# Patient Record
Sex: FEMALE | Race: WHITE | NOT HISPANIC OR LATINO | Employment: OTHER | URBAN - METROPOLITAN AREA
[De-identification: names, ages, dates, MRNs, and addresses within clinical notes are randomized per-mention and may not be internally consistent; named-entity substitution may affect disease eponyms.]

---

## 2017-04-19 ENCOUNTER — AMBULATORY - HEALTHEAST (OUTPATIENT)
Dept: SURGERY | Facility: CLINIC | Age: 82
End: 2017-04-19

## 2017-07-12 ENCOUNTER — RECORDS - HEALTHEAST (OUTPATIENT)
Dept: ADMINISTRATIVE | Facility: OTHER | Age: 82
End: 2017-07-12

## 2017-07-25 ENCOUNTER — COMMUNICATION - HEALTHEAST (OUTPATIENT)
Dept: CARDIOLOGY | Facility: CLINIC | Age: 82
End: 2017-07-25

## 2017-07-27 ENCOUNTER — COMMUNICATION - HEALTHEAST (OUTPATIENT)
Dept: TELEHEALTH | Facility: CLINIC | Age: 82
End: 2017-07-27

## 2017-07-27 ENCOUNTER — HOSPITAL ENCOUNTER (OUTPATIENT)
Dept: CT IMAGING | Facility: CLINIC | Age: 82
Discharge: HOME | End: 2017-07-27
Attending: FAMILY MEDICINE

## 2017-07-27 DIAGNOSIS — R07.89 ATYPICAL CHEST PAIN: ICD-10-CM

## 2017-07-27 LAB
BSA FOR ECHO PROCEDURE: 1.43 M2
CV CALCIUM SCORE AGATSTON LM: 0
CV CALCIUM SCORING AGATSON LAD: 285
CV CALCIUM SCORING AGATSTON CX: 21
CV CALCIUM SCORING AGATSTON RCA: 154
CV CALCIUM SCORING AGATSTON TOTAL: 460
LEFT VENTRICLE HEART RATE: 69 BPM

## 2017-07-27 ASSESSMENT — MIFFLIN-ST. JEOR: SCORE: 850.05

## 2017-08-03 ENCOUNTER — RECORDS - HEALTHEAST (OUTPATIENT)
Dept: ADMINISTRATIVE | Facility: OTHER | Age: 82
End: 2017-08-03

## 2017-08-03 ENCOUNTER — AMBULATORY - HEALTHEAST (OUTPATIENT)
Dept: CARDIOLOGY | Facility: CLINIC | Age: 82
End: 2017-08-03

## 2017-08-09 ENCOUNTER — OFFICE VISIT - HEALTHEAST (OUTPATIENT)
Dept: CARDIOLOGY | Facility: CLINIC | Age: 82
End: 2017-08-09

## 2017-08-09 DIAGNOSIS — I25.84 CORONARY ARTERY DISEASE DUE TO CALCIFIED CORONARY LESION: ICD-10-CM

## 2017-08-09 DIAGNOSIS — I25.10 CORONARY ARTERY DISEASE DUE TO CALCIFIED CORONARY LESION: ICD-10-CM

## 2017-08-09 ASSESSMENT — MIFFLIN-ST. JEOR: SCORE: 872.73

## 2017-08-14 ENCOUNTER — COMMUNICATION - HEALTHEAST (OUTPATIENT)
Dept: CARDIOLOGY | Facility: CLINIC | Age: 82
End: 2017-08-14

## 2017-09-12 ENCOUNTER — OFFICE VISIT - HEALTHEAST (OUTPATIENT)
Dept: FAMILY MEDICINE | Facility: CLINIC | Age: 82
End: 2017-09-12

## 2017-09-12 DIAGNOSIS — R53.83 FATIGUE: ICD-10-CM

## 2017-09-12 DIAGNOSIS — R35.0 URINARY FREQUENCY: ICD-10-CM

## 2017-09-12 ASSESSMENT — MIFFLIN-ST. JEOR: SCORE: 872.73

## 2017-11-02 ENCOUNTER — OFFICE VISIT - HEALTHEAST (OUTPATIENT)
Dept: CARDIOLOGY | Facility: CLINIC | Age: 82
End: 2017-11-02

## 2017-11-02 DIAGNOSIS — E78.00 HYPERCHOLESTEROLEMIA: ICD-10-CM

## 2017-11-02 DIAGNOSIS — I25.10 CORONARY ARTERY DISEASE DUE TO CALCIFIED CORONARY LESION: ICD-10-CM

## 2017-11-02 DIAGNOSIS — I10 ESSENTIAL HYPERTENSION: ICD-10-CM

## 2017-11-02 DIAGNOSIS — I25.84 CORONARY ARTERY DISEASE DUE TO CALCIFIED CORONARY LESION: ICD-10-CM

## 2017-11-02 ASSESSMENT — MIFFLIN-ST. JEOR: SCORE: 873.64

## 2017-11-14 ENCOUNTER — RECORDS - HEALTHEAST (OUTPATIENT)
Dept: LAB | Facility: CLINIC | Age: 82
End: 2017-11-14

## 2017-11-14 LAB
ALT SERPL W P-5'-P-CCNC: 15 U/L (ref 0–45)
AST SERPL W P-5'-P-CCNC: 22 U/L (ref 0–40)
CHOLEST SERPL-MCNC: 138 MG/DL
FASTING STATUS PATIENT QL REPORTED: NO
HDLC SERPL-MCNC: 53 MG/DL
LDLC SERPL CALC-MCNC: 69 MG/DL
TRIGL SERPL-MCNC: 80 MG/DL

## 2017-11-15 ENCOUNTER — AMBULATORY - HEALTHEAST (OUTPATIENT)
Dept: CARDIOLOGY | Facility: CLINIC | Age: 82
End: 2017-11-15

## 2018-01-02 ENCOUNTER — AMBULATORY - HEALTHEAST (OUTPATIENT)
Dept: CARDIOLOGY | Facility: CLINIC | Age: 83
End: 2018-01-02

## 2018-01-02 DIAGNOSIS — E78.00 HYPERCHOLESTEROLEMIA: ICD-10-CM

## 2018-01-02 LAB
ALBUMIN SERPL-MCNC: 4.1 G/DL (ref 3.5–5)
ALP SERPL-CCNC: 71 U/L (ref 45–120)
ALT SERPL W P-5'-P-CCNC: 17 U/L (ref 0–45)
AST SERPL W P-5'-P-CCNC: 21 U/L (ref 0–40)
BILIRUB DIRECT SERPL-MCNC: 0.3 MG/DL
BILIRUB SERPL-MCNC: 0.8 MG/DL (ref 0–1)
CHOLEST SERPL-MCNC: 161 MG/DL
CK SERPL-CCNC: 176 U/L (ref 30–190)
FASTING STATUS PATIENT QL REPORTED: YES
HDLC SERPL-MCNC: 67 MG/DL
LDLC SERPL CALC-MCNC: 78 MG/DL
PROT SERPL-MCNC: 7 G/DL (ref 6–8)
TRIGL SERPL-MCNC: 80 MG/DL

## 2018-01-05 ENCOUNTER — COMMUNICATION - HEALTHEAST (OUTPATIENT)
Dept: CARDIOLOGY | Facility: CLINIC | Age: 83
End: 2018-01-05

## 2018-01-11 ENCOUNTER — COMMUNICATION - HEALTHEAST (OUTPATIENT)
Dept: CARDIOLOGY | Facility: CLINIC | Age: 83
End: 2018-01-11

## 2018-01-17 ENCOUNTER — RECORDS - HEALTHEAST (OUTPATIENT)
Dept: ADMINISTRATIVE | Facility: OTHER | Age: 83
End: 2018-01-17

## 2018-01-25 ENCOUNTER — COMMUNICATION - HEALTHEAST (OUTPATIENT)
Dept: ADMINISTRATIVE | Facility: CLINIC | Age: 83
End: 2018-01-25

## 2018-02-12 ENCOUNTER — RECORDS - HEALTHEAST (OUTPATIENT)
Dept: LAB | Facility: CLINIC | Age: 83
End: 2018-02-12

## 2018-02-12 LAB
ALBUMIN SERPL-MCNC: 4.2 G/DL (ref 3.5–5)
ALP SERPL-CCNC: 63 U/L (ref 45–120)
ALT SERPL W P-5'-P-CCNC: 21 U/L (ref 0–45)
ANION GAP SERPL CALCULATED.3IONS-SCNC: 8 MMOL/L (ref 5–18)
AST SERPL W P-5'-P-CCNC: 23 U/L (ref 0–40)
BILIRUB SERPL-MCNC: 0.7 MG/DL (ref 0–1)
BUN SERPL-MCNC: 9 MG/DL (ref 8–28)
CALCIUM SERPL-MCNC: 9.8 MG/DL (ref 8.5–10.5)
CHLORIDE BLD-SCNC: 96 MMOL/L (ref 98–107)
CO2 SERPL-SCNC: 31 MMOL/L (ref 22–31)
CREAT SERPL-MCNC: 0.67 MG/DL (ref 0.6–1.1)
GFR SERPL CREATININE-BSD FRML MDRD: >60 ML/MIN/1.73M2
GLUCOSE BLD-MCNC: 90 MG/DL (ref 70–125)
LIPASE SERPL-CCNC: 35 U/L (ref 0–52)
POTASSIUM BLD-SCNC: 4 MMOL/L (ref 3.5–5)
PROT SERPL-MCNC: 7.3 G/DL (ref 6–8)
SODIUM SERPL-SCNC: 135 MMOL/L (ref 136–145)

## 2018-02-27 ENCOUNTER — RECORDS - HEALTHEAST (OUTPATIENT)
Dept: ADMINISTRATIVE | Facility: OTHER | Age: 83
End: 2018-02-27

## 2018-02-27 ENCOUNTER — AMBULATORY - HEALTHEAST (OUTPATIENT)
Dept: CARDIOLOGY | Facility: CLINIC | Age: 83
End: 2018-02-27

## 2018-03-01 ENCOUNTER — OFFICE VISIT - HEALTHEAST (OUTPATIENT)
Dept: CARDIOLOGY | Facility: CLINIC | Age: 83
End: 2018-03-01

## 2018-03-01 DIAGNOSIS — I10 ESSENTIAL HYPERTENSION: ICD-10-CM

## 2018-03-01 DIAGNOSIS — I25.84 CORONARY ARTERY DISEASE DUE TO CALCIFIED CORONARY LESION: ICD-10-CM

## 2018-03-01 DIAGNOSIS — I25.10 CORONARY ARTERY DISEASE DUE TO CALCIFIED CORONARY LESION: ICD-10-CM

## 2018-03-01 DIAGNOSIS — E78.00 HYPERCHOLESTEROLEMIA: ICD-10-CM

## 2018-03-01 ASSESSMENT — MIFFLIN-ST. JEOR: SCORE: 886.34

## 2018-03-14 ENCOUNTER — RECORDS - HEALTHEAST (OUTPATIENT)
Dept: ADMINISTRATIVE | Facility: OTHER | Age: 83
End: 2018-03-14

## 2018-03-19 ENCOUNTER — HOSPITAL ENCOUNTER (OUTPATIENT)
Dept: NUCLEAR MEDICINE | Facility: HOSPITAL | Age: 83
Discharge: HOME | End: 2018-03-19
Attending: FAMILY MEDICINE

## 2018-03-19 DIAGNOSIS — R11.0 NAUSEA: ICD-10-CM

## 2018-03-19 ASSESSMENT — MIFFLIN-ST. JEOR: SCORE: 872.73

## 2018-03-21 ENCOUNTER — RECORDS - HEALTHEAST (OUTPATIENT)
Dept: ADMINISTRATIVE | Facility: OTHER | Age: 83
End: 2018-03-21

## 2018-05-21 ENCOUNTER — RECORDS - HEALTHEAST (OUTPATIENT)
Dept: LAB | Facility: CLINIC | Age: 83
End: 2018-05-21

## 2018-05-21 LAB
ALBUMIN SERPL-MCNC: 4.2 G/DL (ref 3.5–5)
ALP SERPL-CCNC: 82 U/L (ref 45–120)
ALT SERPL W P-5'-P-CCNC: 15 U/L (ref 0–45)
ANION GAP SERPL CALCULATED.3IONS-SCNC: 14 MMOL/L (ref 5–18)
AST SERPL W P-5'-P-CCNC: 21 U/L (ref 0–40)
BILIRUB SERPL-MCNC: 0.5 MG/DL (ref 0–1)
BUN SERPL-MCNC: 10 MG/DL (ref 8–28)
C REACTIVE PROTEIN LHE: 1.7 MG/DL (ref 0–0.8)
CALCIUM SERPL-MCNC: 9.6 MG/DL (ref 8.5–10.5)
CHLORIDE BLD-SCNC: 96 MMOL/L (ref 98–107)
CO2 SERPL-SCNC: 23 MMOL/L (ref 22–31)
CREAT SERPL-MCNC: 0.77 MG/DL (ref 0.6–1.1)
ERYTHROCYTE [SEDIMENTATION RATE] IN BLOOD BY WESTERGREN METHOD: 11 MM/HR (ref 0–20)
GFR SERPL CREATININE-BSD FRML MDRD: >60 ML/MIN/1.73M2
GLUCOSE BLD-MCNC: 95 MG/DL (ref 70–125)
POTASSIUM BLD-SCNC: 3.8 MMOL/L (ref 3.5–5)
PROT SERPL-MCNC: 7 G/DL (ref 6–8)
SODIUM SERPL-SCNC: 133 MMOL/L (ref 136–145)
T4 FREE SERPL-MCNC: 1.1 NG/DL (ref 0.7–1.8)
TSH SERPL DL<=0.005 MIU/L-ACNC: 1.12 UIU/ML (ref 0.3–5)

## 2018-05-22 ENCOUNTER — RECORDS - HEALTHEAST (OUTPATIENT)
Dept: LAB | Facility: CLINIC | Age: 83
End: 2018-05-22

## 2018-05-22 LAB
CCP AB SER IA-ACNC: 1.3 U/ML
RHEUMATOID FACT SERPL-ACNC: <15 IU/ML (ref 0–30)

## 2018-05-24 LAB — ANA SER QL: 0.1 U

## 2018-05-31 ENCOUNTER — RECORDS - HEALTHEAST (OUTPATIENT)
Dept: ADMINISTRATIVE | Facility: OTHER | Age: 83
End: 2018-05-31

## 2018-06-06 ENCOUNTER — COMMUNICATION - HEALTHEAST (OUTPATIENT)
Dept: CARDIOLOGY | Facility: CLINIC | Age: 83
End: 2018-06-06

## 2018-06-07 ENCOUNTER — COMMUNICATION - HEALTHEAST (OUTPATIENT)
Dept: SCHEDULING | Facility: CLINIC | Age: 83
End: 2018-06-07

## 2018-06-18 ENCOUNTER — AMBULATORY - HEALTHEAST (OUTPATIENT)
Dept: CARDIOLOGY | Facility: CLINIC | Age: 83
End: 2018-06-18

## 2018-06-18 ENCOUNTER — RECORDS - HEALTHEAST (OUTPATIENT)
Dept: ADMINISTRATIVE | Facility: OTHER | Age: 83
End: 2018-06-18

## 2018-06-20 ENCOUNTER — OFFICE VISIT - HEALTHEAST (OUTPATIENT)
Dept: CARDIOLOGY | Facility: CLINIC | Age: 83
End: 2018-06-20

## 2018-06-20 DIAGNOSIS — I25.10 CORONARY ARTERY DISEASE DUE TO CALCIFIED CORONARY LESION: ICD-10-CM

## 2018-06-20 DIAGNOSIS — E78.00 HYPERCHOLESTEROLEMIA: ICD-10-CM

## 2018-06-20 DIAGNOSIS — R07.89 ATYPICAL CHEST PAIN: ICD-10-CM

## 2018-06-20 DIAGNOSIS — I25.84 CORONARY ARTERY DISEASE DUE TO CALCIFIED CORONARY LESION: ICD-10-CM

## 2018-06-20 DIAGNOSIS — I10 ESSENTIAL HYPERTENSION: ICD-10-CM

## 2018-06-20 ASSESSMENT — MIFFLIN-ST. JEOR: SCORE: 886.34

## 2018-08-06 ENCOUNTER — RECORDS - HEALTHEAST (OUTPATIENT)
Dept: LAB | Facility: CLINIC | Age: 83
End: 2018-08-06

## 2018-08-06 LAB
C REACTIVE PROTEIN LHE: 0.1 MG/DL (ref 0–0.8)
ERYTHROCYTE [SEDIMENTATION RATE] IN BLOOD BY WESTERGREN METHOD: 7 MM/HR (ref 0–20)

## 2018-10-10 ENCOUNTER — RECORDS - HEALTHEAST (OUTPATIENT)
Dept: ADMINISTRATIVE | Facility: OTHER | Age: 83
End: 2018-10-10

## 2018-10-23 ENCOUNTER — COMMUNICATION - HEALTHEAST (OUTPATIENT)
Dept: ADMINISTRATIVE | Facility: CLINIC | Age: 83
End: 2018-10-23

## 2018-11-08 ENCOUNTER — COMMUNICATION - HEALTHEAST (OUTPATIENT)
Dept: CARDIOLOGY | Facility: CLINIC | Age: 83
End: 2018-11-08

## 2018-11-08 DIAGNOSIS — E78.00 HYPERCHOLESTEROLEMIA: ICD-10-CM

## 2018-12-17 ENCOUNTER — RECORDS - HEALTHEAST (OUTPATIENT)
Dept: ADMINISTRATIVE | Facility: OTHER | Age: 83
End: 2018-12-17

## 2018-12-20 ENCOUNTER — OFFICE VISIT - HEALTHEAST (OUTPATIENT)
Dept: CARDIOLOGY | Facility: CLINIC | Age: 83
End: 2018-12-20

## 2018-12-20 DIAGNOSIS — I25.10 CORONARY ARTERY DISEASE DUE TO CALCIFIED CORONARY LESION: ICD-10-CM

## 2018-12-20 DIAGNOSIS — I10 ESSENTIAL HYPERTENSION: ICD-10-CM

## 2018-12-20 DIAGNOSIS — I25.84 CORONARY ARTERY DISEASE DUE TO CALCIFIED CORONARY LESION: ICD-10-CM

## 2018-12-20 DIAGNOSIS — E78.00 HYPERCHOLESTEROLEMIA: ICD-10-CM

## 2018-12-20 ASSESSMENT — MIFFLIN-ST. JEOR: SCORE: 881.8

## 2019-03-26 ENCOUNTER — RECORDS - HEALTHEAST (OUTPATIENT)
Dept: LAB | Facility: CLINIC | Age: 84
End: 2019-03-26

## 2019-03-27 LAB
HSV SPECIMEN: NORMAL
HSV1 DNA SPEC QL NAA+PROBE: NEGATIVE
HSV2 DNA SPEC QL NAA+PROBE: NEGATIVE

## 2019-03-28 LAB — BACTERIA SPEC CULT: ABNORMAL

## 2019-05-14 ENCOUNTER — RECORDS - HEALTHEAST (OUTPATIENT)
Dept: LAB | Facility: CLINIC | Age: 84
End: 2019-05-14

## 2019-05-14 LAB
ALBUMIN SERPL-MCNC: 4.3 G/DL (ref 3.5–5)
ALP SERPL-CCNC: 59 U/L (ref 45–120)
ALT SERPL W P-5'-P-CCNC: 15 U/L (ref 0–45)
ANION GAP SERPL CALCULATED.3IONS-SCNC: 10 MMOL/L (ref 5–18)
AST SERPL W P-5'-P-CCNC: 19 U/L (ref 0–40)
BILIRUB SERPL-MCNC: 0.7 MG/DL (ref 0–1)
BUN SERPL-MCNC: 13 MG/DL (ref 8–28)
CALCIUM SERPL-MCNC: 10 MG/DL (ref 8.5–10.5)
CHLORIDE BLD-SCNC: 97 MMOL/L (ref 98–107)
CHOLEST SERPL-MCNC: 154 MG/DL
CO2 SERPL-SCNC: 27 MMOL/L (ref 22–31)
CREAT SERPL-MCNC: 0.76 MG/DL (ref 0.6–1.1)
FASTING STATUS PATIENT QL REPORTED: NORMAL
GFR SERPL CREATININE-BSD FRML MDRD: >60 ML/MIN/1.73M2
GLUCOSE BLD-MCNC: 88 MG/DL (ref 70–125)
HDLC SERPL-MCNC: 62 MG/DL
LDLC SERPL CALC-MCNC: 81 MG/DL
POTASSIUM BLD-SCNC: 4.1 MMOL/L (ref 3.5–5)
PROT SERPL-MCNC: 6.9 G/DL (ref 6–8)
SODIUM SERPL-SCNC: 134 MMOL/L (ref 136–145)
TRIGL SERPL-MCNC: 53 MG/DL

## 2019-05-17 ENCOUNTER — RECORDS - HEALTHEAST (OUTPATIENT)
Dept: LAB | Facility: CLINIC | Age: 84
End: 2019-05-17

## 2019-05-17 LAB — TSH SERPL DL<=0.005 MIU/L-ACNC: 1.81 UIU/ML (ref 0.3–5)

## 2019-10-07 ENCOUNTER — OFFICE VISIT - HEALTHEAST (OUTPATIENT)
Dept: FAMILY MEDICINE | Facility: CLINIC | Age: 84
End: 2019-10-07

## 2019-10-07 DIAGNOSIS — J32.1 CHRONIC FRONTAL SINUSITIS: ICD-10-CM

## 2019-10-07 DIAGNOSIS — J43.9 PULMONARY EMPHYSEMA, UNSPECIFIED EMPHYSEMA TYPE (H): ICD-10-CM

## 2019-10-07 ASSESSMENT — MIFFLIN-ST. JEOR: SCORE: 852.04

## 2019-10-11 ENCOUNTER — COMMUNICATION - HEALTHEAST (OUTPATIENT)
Dept: FAMILY MEDICINE | Facility: CLINIC | Age: 84
End: 2019-10-11

## 2019-10-18 ENCOUNTER — COMMUNICATION - HEALTHEAST (OUTPATIENT)
Dept: FAMILY MEDICINE | Facility: CLINIC | Age: 84
End: 2019-10-18

## 2019-10-18 DIAGNOSIS — I10 ESSENTIAL HYPERTENSION: ICD-10-CM

## 2019-11-06 ENCOUNTER — COMMUNICATION - HEALTHEAST (OUTPATIENT)
Dept: FAMILY MEDICINE | Facility: CLINIC | Age: 84
End: 2019-11-06

## 2019-11-09 ENCOUNTER — COMMUNICATION - HEALTHEAST (OUTPATIENT)
Dept: FAMILY MEDICINE | Facility: CLINIC | Age: 84
End: 2019-11-09

## 2019-11-09 DIAGNOSIS — I10 ESSENTIAL HYPERTENSION: ICD-10-CM

## 2019-11-22 ENCOUNTER — OFFICE VISIT - HEALTHEAST (OUTPATIENT)
Dept: FAMILY MEDICINE | Facility: CLINIC | Age: 84
End: 2019-11-22

## 2019-11-22 DIAGNOSIS — J98.4 LUNG DISEASE: ICD-10-CM

## 2019-11-22 DIAGNOSIS — R10.12 LUQ ABDOMINAL PAIN: ICD-10-CM

## 2019-11-22 ASSESSMENT — MIFFLIN-ST. JEOR: SCORE: 838.99

## 2019-12-02 ENCOUNTER — COMMUNICATION - HEALTHEAST (OUTPATIENT)
Dept: SCHEDULING | Facility: CLINIC | Age: 84
End: 2019-12-02

## 2019-12-06 ENCOUNTER — COMMUNICATION - HEALTHEAST (OUTPATIENT)
Dept: FAMILY MEDICINE | Facility: CLINIC | Age: 84
End: 2019-12-06

## 2019-12-06 DIAGNOSIS — I10 ESSENTIAL HYPERTENSION: ICD-10-CM

## 2019-12-08 ENCOUNTER — COMMUNICATION - HEALTHEAST (OUTPATIENT)
Dept: SCHEDULING | Facility: CLINIC | Age: 84
End: 2019-12-08

## 2019-12-08 DIAGNOSIS — J44.9 CHRONIC OBSTRUCTIVE PULMONARY DISEASE, UNSPECIFIED COPD TYPE (H): ICD-10-CM

## 2019-12-11 ENCOUNTER — COMMUNICATION - HEALTHEAST (OUTPATIENT)
Dept: SCHEDULING | Facility: CLINIC | Age: 84
End: 2019-12-11

## 2019-12-11 DIAGNOSIS — J44.9 CHRONIC OBSTRUCTIVE PULMONARY DISEASE, UNSPECIFIED COPD TYPE (H): ICD-10-CM

## 2019-12-15 ENCOUNTER — COMMUNICATION - HEALTHEAST (OUTPATIENT)
Dept: FAMILY MEDICINE | Facility: CLINIC | Age: 84
End: 2019-12-15

## 2019-12-15 DIAGNOSIS — F41.9 ANXIETY: ICD-10-CM

## 2019-12-16 ENCOUNTER — RECORDS - HEALTHEAST (OUTPATIENT)
Dept: FAMILY MEDICINE | Facility: CLINIC | Age: 84
End: 2019-12-16

## 2019-12-16 ENCOUNTER — COMMUNICATION - HEALTHEAST (OUTPATIENT)
Dept: FAMILY MEDICINE | Facility: CLINIC | Age: 84
End: 2019-12-16

## 2019-12-16 DIAGNOSIS — F41.1 ANXIETY, GENERALIZED: ICD-10-CM

## 2019-12-16 DIAGNOSIS — I10 ESSENTIAL HYPERTENSION: ICD-10-CM

## 2019-12-17 ENCOUNTER — OFFICE VISIT - HEALTHEAST (OUTPATIENT)
Dept: FAMILY MEDICINE | Facility: CLINIC | Age: 84
End: 2019-12-17

## 2019-12-17 ENCOUNTER — COMMUNICATION - HEALTHEAST (OUTPATIENT)
Dept: SCHEDULING | Facility: CLINIC | Age: 84
End: 2019-12-17

## 2019-12-17 DIAGNOSIS — J01.01 ACUTE RECURRENT MAXILLARY SINUSITIS: ICD-10-CM

## 2019-12-17 DIAGNOSIS — R06.02 SHORTNESS OF BREATH: ICD-10-CM

## 2019-12-17 ASSESSMENT — MIFFLIN-ST. JEOR: SCORE: 832.19

## 2019-12-20 ENCOUNTER — COMMUNICATION - HEALTHEAST (OUTPATIENT)
Dept: FAMILY MEDICINE | Facility: CLINIC | Age: 84
End: 2019-12-20

## 2019-12-20 DIAGNOSIS — F41.9 ANXIETY: ICD-10-CM

## 2019-12-20 DIAGNOSIS — F41.1 ANXIETY, GENERALIZED: ICD-10-CM

## 2020-01-14 ENCOUNTER — OFFICE VISIT - HEALTHEAST (OUTPATIENT)
Dept: FAMILY MEDICINE | Facility: CLINIC | Age: 85
End: 2020-01-14

## 2020-01-14 DIAGNOSIS — F41.1 ANXIETY, GENERALIZED: ICD-10-CM

## 2020-01-14 DIAGNOSIS — R05.9 COUGH: ICD-10-CM

## 2020-01-14 DIAGNOSIS — I10 ESSENTIAL HYPERTENSION: ICD-10-CM

## 2020-01-14 DIAGNOSIS — E78.00 HYPERCHOLESTEROLEMIA: ICD-10-CM

## 2020-01-14 DIAGNOSIS — D64.9 ANEMIA, UNSPECIFIED TYPE: ICD-10-CM

## 2020-01-14 DIAGNOSIS — Z00.01 ENCOUNTER FOR GENERAL ADULT MEDICAL EXAMINATION WITH ABNORMAL FINDINGS: ICD-10-CM

## 2020-01-14 LAB
ALBUMIN SERPL-MCNC: 4.4 G/DL (ref 3.5–5)
ALP SERPL-CCNC: 67 U/L (ref 45–120)
ALT SERPL W P-5'-P-CCNC: 14 U/L (ref 0–45)
ANION GAP SERPL CALCULATED.3IONS-SCNC: 10 MMOL/L (ref 5–18)
AST SERPL W P-5'-P-CCNC: 23 U/L (ref 0–40)
BILIRUB SERPL-MCNC: 0.8 MG/DL (ref 0–1)
BUN SERPL-MCNC: 14 MG/DL (ref 8–28)
CALCIUM SERPL-MCNC: 9.6 MG/DL (ref 8.5–10.5)
CHLORIDE BLD-SCNC: 100 MMOL/L (ref 98–107)
CHOLEST SERPL-MCNC: 151 MG/DL
CO2 SERPL-SCNC: 28 MMOL/L (ref 22–31)
CREAT SERPL-MCNC: 0.72 MG/DL (ref 0.6–1.1)
FASTING STATUS PATIENT QL REPORTED: YES
GFR SERPL CREATININE-BSD FRML MDRD: >60 ML/MIN/1.73M2
GLUCOSE BLD-MCNC: 91 MG/DL (ref 70–125)
HDLC SERPL-MCNC: 49 MG/DL
HGB BLD-MCNC: 15.1 G/DL (ref 12–16)
LDLC SERPL CALC-MCNC: 64 MG/DL
POTASSIUM BLD-SCNC: 4 MMOL/L (ref 3.5–5)
PROT SERPL-MCNC: 7.1 G/DL (ref 6–8)
SODIUM SERPL-SCNC: 138 MMOL/L (ref 136–145)
TRIGL SERPL-MCNC: 190 MG/DL

## 2020-01-14 ASSESSMENT — MIFFLIN-ST. JEOR: SCORE: 812.06

## 2020-01-15 ENCOUNTER — COMMUNICATION - HEALTHEAST (OUTPATIENT)
Dept: FAMILY MEDICINE | Facility: CLINIC | Age: 85
End: 2020-01-15

## 2020-01-16 ENCOUNTER — COMMUNICATION - HEALTHEAST (OUTPATIENT)
Dept: FAMILY MEDICINE | Facility: CLINIC | Age: 85
End: 2020-01-16

## 2020-01-16 DIAGNOSIS — F41.9 ANXIETY: ICD-10-CM

## 2020-01-16 DIAGNOSIS — F41.1 ANXIETY, GENERALIZED: ICD-10-CM

## 2020-01-20 ENCOUNTER — COMMUNICATION - HEALTHEAST (OUTPATIENT)
Dept: FAMILY MEDICINE | Facility: CLINIC | Age: 85
End: 2020-01-20

## 2020-01-20 DIAGNOSIS — E78.00 HYPERCHOLESTEROLEMIA: ICD-10-CM

## 2020-01-20 DIAGNOSIS — I10 ESSENTIAL HYPERTENSION: ICD-10-CM

## 2020-01-31 ENCOUNTER — COMMUNICATION - HEALTHEAST (OUTPATIENT)
Dept: FAMILY MEDICINE | Facility: CLINIC | Age: 85
End: 2020-01-31

## 2020-01-31 DIAGNOSIS — F41.9 ANXIETY: ICD-10-CM

## 2020-02-17 ENCOUNTER — COMMUNICATION - HEALTHEAST (OUTPATIENT)
Dept: FAMILY MEDICINE | Facility: CLINIC | Age: 85
End: 2020-02-17

## 2020-02-17 DIAGNOSIS — F41.1 ANXIETY, GENERALIZED: ICD-10-CM

## 2020-03-12 ENCOUNTER — COMMUNICATION - HEALTHEAST (OUTPATIENT)
Dept: FAMILY MEDICINE | Facility: CLINIC | Age: 85
End: 2020-03-12

## 2020-03-12 DIAGNOSIS — I10 ESSENTIAL HYPERTENSION: ICD-10-CM

## 2020-03-12 DIAGNOSIS — R05.9 COUGH: ICD-10-CM

## 2020-03-13 ENCOUNTER — COMMUNICATION - HEALTHEAST (OUTPATIENT)
Dept: FAMILY MEDICINE | Facility: CLINIC | Age: 85
End: 2020-03-13

## 2020-03-13 DIAGNOSIS — F41.9 ANXIETY: ICD-10-CM

## 2020-03-23 ENCOUNTER — COMMUNICATION - HEALTHEAST (OUTPATIENT)
Dept: FAMILY MEDICINE | Facility: CLINIC | Age: 85
End: 2020-03-23

## 2020-03-23 DIAGNOSIS — F41.1 ANXIETY, GENERALIZED: ICD-10-CM

## 2020-03-28 ENCOUNTER — COMMUNICATION - HEALTHEAST (OUTPATIENT)
Dept: SCHEDULING | Facility: CLINIC | Age: 85
End: 2020-03-28

## 2020-03-28 DIAGNOSIS — R05.9 COUGH: ICD-10-CM

## 2020-04-15 ENCOUNTER — COMMUNICATION - HEALTHEAST (OUTPATIENT)
Dept: FAMILY MEDICINE | Facility: CLINIC | Age: 85
End: 2020-04-15

## 2020-04-15 DIAGNOSIS — F41.9 ANXIETY: ICD-10-CM

## 2020-04-25 ENCOUNTER — COMMUNICATION - HEALTHEAST (OUTPATIENT)
Dept: FAMILY MEDICINE | Facility: CLINIC | Age: 85
End: 2020-04-25

## 2020-04-25 DIAGNOSIS — F41.1 ANXIETY, GENERALIZED: ICD-10-CM

## 2020-04-30 ENCOUNTER — OFFICE VISIT - HEALTHEAST (OUTPATIENT)
Dept: CARDIOLOGY | Facility: CLINIC | Age: 85
End: 2020-04-30

## 2020-04-30 DIAGNOSIS — I25.84 CORONARY ARTERY DISEASE DUE TO CALCIFIED CORONARY LESION: ICD-10-CM

## 2020-04-30 DIAGNOSIS — I25.10 CORONARY ARTERY DISEASE DUE TO CALCIFIED CORONARY LESION: ICD-10-CM

## 2020-05-19 ENCOUNTER — COMMUNICATION - HEALTHEAST (OUTPATIENT)
Dept: FAMILY MEDICINE | Facility: CLINIC | Age: 85
End: 2020-05-19

## 2020-05-19 DIAGNOSIS — J98.4 LUNG DISEASE: ICD-10-CM

## 2020-05-21 ENCOUNTER — OFFICE VISIT - HEALTHEAST (OUTPATIENT)
Dept: FAMILY MEDICINE | Facility: CLINIC | Age: 85
End: 2020-05-21

## 2020-05-21 DIAGNOSIS — M19.041 PRIMARY OSTEOARTHRITIS OF BOTH HANDS: ICD-10-CM

## 2020-05-21 DIAGNOSIS — R10.9 LEFT SIDED ABDOMINAL PAIN: ICD-10-CM

## 2020-05-21 DIAGNOSIS — M62.81 GENERALIZED MUSCLE WEAKNESS: ICD-10-CM

## 2020-05-21 DIAGNOSIS — F41.1 ANXIETY, GENERALIZED: ICD-10-CM

## 2020-05-21 DIAGNOSIS — I10 ESSENTIAL HYPERTENSION: ICD-10-CM

## 2020-05-21 DIAGNOSIS — M19.042 PRIMARY OSTEOARTHRITIS OF BOTH HANDS: ICD-10-CM

## 2020-05-21 LAB
ALBUMIN SERPL-MCNC: 4.2 G/DL (ref 3.5–5)
ALP SERPL-CCNC: 61 U/L (ref 45–120)
ALT SERPL W P-5'-P-CCNC: 21 U/L (ref 0–45)
ANION GAP SERPL CALCULATED.3IONS-SCNC: 11 MMOL/L (ref 5–18)
AST SERPL W P-5'-P-CCNC: 30 U/L (ref 0–40)
BILIRUB SERPL-MCNC: 0.6 MG/DL (ref 0–1)
BUN SERPL-MCNC: 10 MG/DL (ref 8–28)
CALCIUM SERPL-MCNC: 8.6 MG/DL (ref 8.5–10.5)
CHLORIDE BLD-SCNC: 99 MMOL/L (ref 98–107)
CO2 SERPL-SCNC: 23 MMOL/L (ref 22–31)
CREAT SERPL-MCNC: 0.71 MG/DL (ref 0.6–1.1)
ERYTHROCYTE [DISTWIDTH] IN BLOOD BY AUTOMATED COUNT: 12.6 % (ref 11–14.5)
GFR SERPL CREATININE-BSD FRML MDRD: >60 ML/MIN/1.73M2
GLUCOSE BLD-MCNC: 86 MG/DL (ref 70–125)
HCT VFR BLD AUTO: 41.2 % (ref 35–47)
HGB BLD-MCNC: 14.4 G/DL (ref 12–16)
MCH RBC QN AUTO: 30.8 PG (ref 27–34)
MCHC RBC AUTO-ENTMCNC: 35 G/DL (ref 32–36)
MCV RBC AUTO: 88 FL (ref 80–100)
PLATELET # BLD AUTO: 196 THOU/UL (ref 140–440)
PMV BLD AUTO: 6.8 FL (ref 7–10)
POTASSIUM BLD-SCNC: 3.7 MMOL/L (ref 3.5–5)
PROT SERPL-MCNC: 6.7 G/DL (ref 6–8)
RBC # BLD AUTO: 4.69 MILL/UL (ref 3.8–5.4)
SODIUM SERPL-SCNC: 133 MMOL/L (ref 136–145)
TSH SERPL DL<=0.005 MIU/L-ACNC: 1.47 UIU/ML (ref 0.3–5)
WBC: 5.5 THOU/UL (ref 4–11)

## 2020-05-21 ASSESSMENT — MIFFLIN-ST. JEOR: SCORE: 852.07

## 2020-05-22 ENCOUNTER — COMMUNICATION - HEALTHEAST (OUTPATIENT)
Dept: FAMILY MEDICINE | Facility: CLINIC | Age: 85
End: 2020-05-22

## 2020-05-27 ENCOUNTER — COMMUNICATION - HEALTHEAST (OUTPATIENT)
Dept: FAMILY MEDICINE | Facility: CLINIC | Age: 85
End: 2020-05-27

## 2020-05-27 DIAGNOSIS — F41.1 ANXIETY, GENERALIZED: ICD-10-CM

## 2020-05-27 DIAGNOSIS — F41.9 ANXIETY: ICD-10-CM

## 2020-06-28 ENCOUNTER — COMMUNICATION - HEALTHEAST (OUTPATIENT)
Dept: FAMILY MEDICINE | Facility: CLINIC | Age: 85
End: 2020-06-28

## 2020-06-28 DIAGNOSIS — F41.9 ANXIETY: ICD-10-CM

## 2020-06-28 DIAGNOSIS — F41.1 ANXIETY, GENERALIZED: ICD-10-CM

## 2020-07-01 ENCOUNTER — COMMUNICATION - HEALTHEAST (OUTPATIENT)
Dept: FAMILY MEDICINE | Facility: CLINIC | Age: 85
End: 2020-07-01

## 2020-07-01 DIAGNOSIS — J98.4 LUNG DISEASE: ICD-10-CM

## 2020-07-02 ENCOUNTER — COMMUNICATION - HEALTHEAST (OUTPATIENT)
Dept: SCHEDULING | Facility: CLINIC | Age: 85
End: 2020-07-02

## 2020-07-10 ENCOUNTER — OFFICE VISIT - HEALTHEAST (OUTPATIENT)
Dept: PULMONOLOGY | Facility: OTHER | Age: 85
End: 2020-07-10

## 2020-07-10 ENCOUNTER — COMMUNICATION - HEALTHEAST (OUTPATIENT)
Dept: CARDIOLOGY | Facility: CLINIC | Age: 85
End: 2020-07-10

## 2020-07-10 DIAGNOSIS — R06.00 DYSPNEA, UNSPECIFIED TYPE: ICD-10-CM

## 2020-07-10 DIAGNOSIS — J32.9 CHRONIC SINUSITIS, UNSPECIFIED LOCATION: ICD-10-CM

## 2020-07-23 ENCOUNTER — HOSPITAL ENCOUNTER (OUTPATIENT)
Dept: CARDIOLOGY | Facility: HOSPITAL | Age: 85
Discharge: HOME | End: 2020-07-23
Attending: INTERNAL MEDICINE

## 2020-07-23 ENCOUNTER — COMMUNICATION - HEALTHEAST (OUTPATIENT)
Dept: PULMONOLOGY | Facility: OTHER | Age: 85
End: 2020-07-23

## 2020-07-23 DIAGNOSIS — R06.00 DYSPNEA, UNSPECIFIED TYPE: ICD-10-CM

## 2020-07-23 LAB
AORTIC ROOT: 3 CM
AORTIC VALVE MEAN VELOCITY: 98.2 CM/S
ASCENDING AORTA: 3.8 CM
AV DIMENSIONLESS INDEX VTI: 0.6
AV MEAN GRADIENT: 5 MMHG
AV PEAK GRADIENT: 8.1 MMHG
AV VALVE AREA: 2 CM2
BSA FOR ECHO PROCEDURE: 1.44 M2
CV BLOOD PRESSURE: ABNORMAL MMHG
CV ECHO HEIGHT: 58.3 IN
CV ECHO WEIGHT: 112 LBS
DOP CALC AO PEAK VEL: 142 CM/S
DOP CALC AO VTI: 36.1 CM
DOP CALC LVOT AREA: 3.46 CM2
DOP CALC LVOT DIAMETER: 2.1 CM
DOP CALC LVOT STROKE VOLUME: 73.7 CM3
DOP CALCLVOT PEAK VEL VTI: 21.3 CM
EJECTION FRACTION: 70 % (ref 55–75)
FRACTIONAL SHORTENING: 40.6 % (ref 28–44)
INTERVENTRICULAR SEPTUM IN END DIASTOLE: 1.1 CM (ref 0.6–0.9)
IVS/PW RATIO: 1.1
LA AREA 1: 13.6 CM2
LA AREA 2: 11.6 CM2
LEFT ATRIUM LENGTH: 4.23 CM
LEFT ATRIUM SIZE: 3 CM
LEFT ATRIUM TO AORTIC ROOT RATIO: 1 NO UNITS
LEFT ATRIUM VOLUME INDEX: 22 ML/M2
LEFT ATRIUM VOLUME: 31.7 ML
LEFT VENTRICLE DIASTOLIC VOLUME INDEX: 61.1 CM3/M2 (ref 29–61)
LEFT VENTRICLE DIASTOLIC VOLUME: 88 CM3 (ref 46–106)
LEFT VENTRICLE MASS INDEX: 67.5 G/M2
LEFT VENTRICLE SYSTOLIC VOLUME INDEX: 18.1 CM3/M2 (ref 8–24)
LEFT VENTRICLE SYSTOLIC VOLUME: 26 CM3 (ref 14–42)
LEFT VENTRICULAR INTERNAL DIMENSION IN DIASTOLE: 3.2 CM (ref 3.8–5.2)
LEFT VENTRICULAR INTERNAL DIMENSION IN SYSTOLE: 1.9 CM (ref 2.2–3.5)
LEFT VENTRICULAR MASS: 97.2 G
LEFT VENTRICULAR OUTFLOW TRACT MEAN GRADIENT: 2 MMHG
LEFT VENTRICULAR OUTFLOW TRACT MEAN VELOCITY: 57.2 CM/S
LEFT VENTRICULAR POSTERIOR WALL IN END DIASTOLE: 1 CM (ref 0.6–0.9)
LV STROKE VOLUME INDEX: 51.2 ML/M2
MITRAL VALVE E/A RATIO: 1
MV AVERAGE E/E' RATIO: 12.2 CM/S
MV DECELERATION TIME: 246 MS
MV E'TISSUE VEL-LAT: 7.21 CM/S
MV E'TISSUE VEL-MED: 7.21 CM/S
MV LATERAL E/E' RATIO: 12.2
MV MEDIAL E/E' RATIO: 12.2
MV PEAK A VELOCITY: 89.8 CM/S
MV PEAK E VELOCITY: 87.9 CM/S
NUC REST DIASTOLIC VOLUME INDEX: 1792 LBS
NUC REST SYSTOLIC VOLUME INDEX: 58.27 IN
TRICUSPID REGURGITATION PEAK PRESSURE GRADIENT: 17.3 MMHG
TRICUSPID VALVE ANULAR PLANE SYSTOLIC EXCURSION: 1.8 CM
TRICUSPID VALVE PEAK REGURGITANT VELOCITY: 208 CM/S

## 2020-07-23 ASSESSMENT — MIFFLIN-ST. JEOR: SCORE: 852.07

## 2020-07-30 ENCOUNTER — COMMUNICATION - HEALTHEAST (OUTPATIENT)
Dept: FAMILY MEDICINE | Facility: CLINIC | Age: 85
End: 2020-07-30

## 2020-07-30 DIAGNOSIS — F41.9 ANXIETY: ICD-10-CM

## 2020-08-01 ENCOUNTER — COMMUNICATION - HEALTHEAST (OUTPATIENT)
Dept: FAMILY MEDICINE | Facility: CLINIC | Age: 85
End: 2020-08-01

## 2020-08-01 DIAGNOSIS — F41.1 ANXIETY, GENERALIZED: ICD-10-CM

## 2020-08-10 ENCOUNTER — COMMUNICATION - HEALTHEAST (OUTPATIENT)
Dept: FAMILY MEDICINE | Facility: CLINIC | Age: 85
End: 2020-08-10

## 2020-08-10 ENCOUNTER — COMMUNICATION - HEALTHEAST (OUTPATIENT)
Dept: SCHEDULING | Facility: CLINIC | Age: 85
End: 2020-08-10

## 2020-08-17 ENCOUNTER — COMMUNICATION - HEALTHEAST (OUTPATIENT)
Dept: PULMONOLOGY | Facility: OTHER | Age: 85
End: 2020-08-17

## 2020-08-18 ENCOUNTER — COMMUNICATION - HEALTHEAST (OUTPATIENT)
Dept: CARDIOLOGY | Facility: CLINIC | Age: 85
End: 2020-08-18

## 2020-08-19 ENCOUNTER — COMMUNICATION - HEALTHEAST (OUTPATIENT)
Dept: CARDIOLOGY | Facility: CLINIC | Age: 85
End: 2020-08-19

## 2020-08-20 ENCOUNTER — OFFICE VISIT - HEALTHEAST (OUTPATIENT)
Dept: CARDIOLOGY | Facility: CLINIC | Age: 85
End: 2020-08-20

## 2020-08-20 DIAGNOSIS — I10 ESSENTIAL HYPERTENSION: ICD-10-CM

## 2020-08-20 DIAGNOSIS — I25.84 CORONARY ARTERY DISEASE DUE TO CALCIFIED CORONARY LESION: ICD-10-CM

## 2020-08-20 DIAGNOSIS — E78.00 HYPERCHOLESTEROLEMIA: ICD-10-CM

## 2020-08-20 DIAGNOSIS — I25.10 CORONARY ARTERY DISEASE DUE TO CALCIFIED CORONARY LESION: ICD-10-CM

## 2020-08-20 ASSESSMENT — MIFFLIN-ST. JEOR: SCORE: 833.04

## 2020-08-27 ENCOUNTER — COMMUNICATION - HEALTHEAST (OUTPATIENT)
Dept: FAMILY MEDICINE | Facility: CLINIC | Age: 85
End: 2020-08-27

## 2020-08-27 DIAGNOSIS — F41.1 ANXIETY, GENERALIZED: ICD-10-CM

## 2020-08-31 ENCOUNTER — COMMUNICATION - HEALTHEAST (OUTPATIENT)
Dept: FAMILY MEDICINE | Facility: CLINIC | Age: 85
End: 2020-08-31

## 2020-08-31 DIAGNOSIS — F41.9 ANXIETY: ICD-10-CM

## 2020-09-01 ENCOUNTER — OFFICE VISIT - HEALTHEAST (OUTPATIENT)
Dept: FAMILY MEDICINE | Facility: CLINIC | Age: 85
End: 2020-09-01

## 2020-09-01 DIAGNOSIS — J44.1 COPD EXACERBATION (H): ICD-10-CM

## 2020-09-01 DIAGNOSIS — F41.1 ANXIETY, GENERALIZED: ICD-10-CM

## 2020-09-01 ASSESSMENT — MIFFLIN-ST. JEOR: SCORE: 835.31

## 2020-09-03 ENCOUNTER — COMMUNICATION - HEALTHEAST (OUTPATIENT)
Dept: FAMILY MEDICINE | Facility: CLINIC | Age: 85
End: 2020-09-03

## 2020-09-03 DIAGNOSIS — F41.9 ANXIETY: ICD-10-CM

## 2020-09-18 ENCOUNTER — RECORDS - HEALTHEAST (OUTPATIENT)
Dept: LAB | Facility: CLINIC | Age: 85
End: 2020-09-18

## 2020-09-18 LAB
ALBUMIN SERPL-MCNC: 4.2 G/DL (ref 3.5–5)
ALP SERPL-CCNC: 58 U/L (ref 45–120)
ALT SERPL W P-5'-P-CCNC: 15 U/L (ref 0–45)
ANION GAP SERPL CALCULATED.3IONS-SCNC: 12 MMOL/L (ref 5–18)
AST SERPL W P-5'-P-CCNC: 19 U/L (ref 0–40)
BILIRUB SERPL-MCNC: 0.5 MG/DL (ref 0–1)
BUN SERPL-MCNC: 12 MG/DL (ref 8–28)
CALCIUM SERPL-MCNC: 9.4 MG/DL (ref 8.5–10.5)
CHLORIDE BLD-SCNC: 100 MMOL/L (ref 98–107)
CHOLEST SERPL-MCNC: 203 MG/DL
CO2 SERPL-SCNC: 26 MMOL/L (ref 22–31)
CREAT SERPL-MCNC: 0.71 MG/DL (ref 0.6–1.1)
ERYTHROCYTE [DISTWIDTH] IN BLOOD BY AUTOMATED COUNT: 14.3 % (ref 11–14.5)
FASTING STATUS PATIENT QL REPORTED: ABNORMAL
GFR SERPL CREATININE-BSD FRML MDRD: >60 ML/MIN/1.73M2
GLUCOSE BLD-MCNC: 83 MG/DL (ref 70–125)
HCT VFR BLD AUTO: 44.7 % (ref 35–47)
HDLC SERPL-MCNC: 57 MG/DL
HGB BLD-MCNC: 14.5 G/DL (ref 12–16)
LDLC SERPL CALC-MCNC: 117 MG/DL
MCH RBC QN AUTO: 29.2 PG (ref 27–34)
MCHC RBC AUTO-ENTMCNC: 32.4 G/DL (ref 32–36)
MCV RBC AUTO: 90 FL (ref 80–100)
PLATELET # BLD AUTO: 228 THOU/UL (ref 140–440)
PMV BLD AUTO: 9.4 FL (ref 8.5–12.5)
POTASSIUM BLD-SCNC: 4 MMOL/L (ref 3.5–5)
PROT SERPL-MCNC: 6.9 G/DL (ref 6–8)
RBC # BLD AUTO: 4.96 MILL/UL (ref 3.8–5.4)
SODIUM SERPL-SCNC: 138 MMOL/L (ref 136–145)
TRIGL SERPL-MCNC: 143 MG/DL
TSH SERPL DL<=0.005 MIU/L-ACNC: 1.31 UIU/ML (ref 0.3–5)
WBC: 5.5 THOU/UL (ref 4–11)

## 2020-10-01 ENCOUNTER — COMMUNICATION - HEALTHEAST (OUTPATIENT)
Dept: FAMILY MEDICINE | Facility: CLINIC | Age: 85
End: 2020-10-01

## 2020-10-01 DIAGNOSIS — F41.9 ANXIETY: ICD-10-CM

## 2020-10-01 DIAGNOSIS — F41.1 ANXIETY, GENERALIZED: ICD-10-CM

## 2020-11-04 ENCOUNTER — COMMUNICATION - HEALTHEAST (OUTPATIENT)
Dept: FAMILY MEDICINE | Facility: CLINIC | Age: 85
End: 2020-11-04

## 2020-11-04 DIAGNOSIS — F41.1 ANXIETY, GENERALIZED: ICD-10-CM

## 2020-12-14 ENCOUNTER — COMMUNICATION - HEALTHEAST (OUTPATIENT)
Dept: FAMILY MEDICINE | Facility: CLINIC | Age: 85
End: 2020-12-14

## 2020-12-14 DIAGNOSIS — F41.1 ANXIETY, GENERALIZED: ICD-10-CM

## 2020-12-16 ENCOUNTER — COMMUNICATION - HEALTHEAST (OUTPATIENT)
Dept: FAMILY MEDICINE | Facility: CLINIC | Age: 85
End: 2020-12-16

## 2020-12-16 DIAGNOSIS — I10 ESSENTIAL HYPERTENSION: ICD-10-CM

## 2020-12-20 ENCOUNTER — COMMUNICATION - HEALTHEAST (OUTPATIENT)
Dept: FAMILY MEDICINE | Facility: CLINIC | Age: 85
End: 2020-12-20

## 2020-12-20 DIAGNOSIS — J98.4 LUNG DISEASE: ICD-10-CM

## 2020-12-20 DIAGNOSIS — F41.1 ANXIETY, GENERALIZED: ICD-10-CM

## 2021-03-31 ENCOUNTER — RECORDS - HEALTHEAST (OUTPATIENT)
Dept: LAB | Facility: CLINIC | Age: 86
End: 2021-03-31

## 2021-03-31 LAB
SARS-COV-2 PCR COMMENT: NORMAL
SARS-COV-2 RNA SPEC QL NAA+PROBE: NEGATIVE
SARS-COV-2 VIRUS SPECIMEN SOURCE: NORMAL

## 2021-04-16 ENCOUNTER — OFFICE VISIT - HEALTHEAST (OUTPATIENT)
Dept: CARDIOLOGY | Facility: CLINIC | Age: 86
End: 2021-04-16

## 2021-04-16 DIAGNOSIS — I10 ESSENTIAL HYPERTENSION: ICD-10-CM

## 2021-04-16 DIAGNOSIS — I25.119 CORONARY ARTERY DISEASE INVOLVING NATIVE CORONARY ARTERY OF NATIVE HEART WITH ANGINA PECTORIS (H): ICD-10-CM

## 2021-04-16 DIAGNOSIS — E78.00 HYPERCHOLESTEROLEMIA: ICD-10-CM

## 2021-04-16 ASSESSMENT — MIFFLIN-ST. JEOR: SCORE: 864.79

## 2021-04-19 ENCOUNTER — COMMUNICATION - HEALTHEAST (OUTPATIENT)
Dept: CARDIOLOGY | Facility: CLINIC | Age: 86
End: 2021-04-19

## 2021-05-14 ENCOUNTER — RECORDS - HEALTHEAST (OUTPATIENT)
Dept: LAB | Facility: CLINIC | Age: 86
End: 2021-05-14

## 2021-05-14 LAB
ANION GAP SERPL CALCULATED.3IONS-SCNC: 11 MMOL/L (ref 5–18)
BUN SERPL-MCNC: 7 MG/DL (ref 8–28)
CALCIUM SERPL-MCNC: 9.1 MG/DL (ref 8.5–10.5)
CHLORIDE BLD-SCNC: 96 MMOL/L (ref 98–107)
CO2 SERPL-SCNC: 26 MMOL/L (ref 22–31)
CREAT SERPL-MCNC: 0.65 MG/DL (ref 0.6–1.1)
GFR SERPL CREATININE-BSD FRML MDRD: >60 ML/MIN/1.73M2
GLUCOSE BLD-MCNC: 97 MG/DL (ref 70–125)
POTASSIUM BLD-SCNC: 3.5 MMOL/L (ref 3.5–5)
SODIUM SERPL-SCNC: 133 MMOL/L (ref 136–145)

## 2021-05-22 ENCOUNTER — HOSPITAL ENCOUNTER (EMERGENCY)
Dept: EMERGENCY MEDICINE | Facility: CLINIC | Age: 86
Discharge: HOME | End: 2021-05-22
Attending: EMERGENCY MEDICINE
Payer: COMMERCIAL

## 2021-05-22 DIAGNOSIS — E87.6 HYPOKALEMIA: ICD-10-CM

## 2021-05-22 DIAGNOSIS — H10.213 CHEMICAL CONJUNCTIVITIS OF BOTH EYES: ICD-10-CM

## 2021-05-22 LAB
ANION GAP SERPL CALCULATED.3IONS-SCNC: 10 MMOL/L (ref 5–18)
BUN SERPL-MCNC: 9 MG/DL (ref 8–28)
CALCIUM SERPL-MCNC: 8.6 MG/DL (ref 8.5–10.5)
CHLORIDE BLD-SCNC: 94 MMOL/L (ref 98–107)
CO2 SERPL-SCNC: 26 MMOL/L (ref 22–31)
CREAT SERPL-MCNC: 0.65 MG/DL (ref 0.6–1.1)
ERYTHROCYTE [DISTWIDTH] IN BLOOD BY AUTOMATED COUNT: 12.7 % (ref 11–14.5)
ERYTHROCYTE [SEDIMENTATION RATE] IN BLOOD BY WESTERGREN METHOD: 5 MM/HR (ref 0–20)
GFR SERPL CREATININE-BSD FRML MDRD: >60 ML/MIN/1.73M2
GLUCOSE BLD-MCNC: 88 MG/DL (ref 70–125)
GLUCOSE BLDC GLUCOMTR-MCNC: 91 MG/DL (ref 70–139)
HCT VFR BLD AUTO: 39.6 % (ref 35–47)
HGB BLD-MCNC: 13.8 G/DL (ref 12–16)
MCH RBC QN AUTO: 29.4 PG (ref 27–34)
MCHC RBC AUTO-ENTMCNC: 34.8 G/DL (ref 32–36)
MCV RBC AUTO: 84 FL (ref 80–100)
PLATELET # BLD AUTO: 241 THOU/UL (ref 140–440)
PMV BLD AUTO: 8.6 FL (ref 8.5–12.5)
POTASSIUM BLD-SCNC: 3.2 MMOL/L (ref 3.5–5)
RBC # BLD AUTO: 4.7 MILL/UL (ref 3.8–5.4)
SODIUM SERPL-SCNC: 130 MMOL/L (ref 136–145)
WBC: 5.5 THOU/UL (ref 4–11)

## 2021-05-24 ENCOUNTER — RECORDS - HEALTHEAST (OUTPATIENT)
Dept: ADMINISTRATIVE | Facility: CLINIC | Age: 86
End: 2021-05-24

## 2021-05-25 ENCOUNTER — RECORDS - HEALTHEAST (OUTPATIENT)
Dept: ADMINISTRATIVE | Facility: CLINIC | Age: 86
End: 2021-05-25

## 2021-05-26 ENCOUNTER — TRANSFERRED RECORDS (OUTPATIENT)
Dept: HEALTH INFORMATION MANAGEMENT | Facility: CLINIC | Age: 86
End: 2021-05-26

## 2021-05-26 ENCOUNTER — RECORDS - HEALTHEAST (OUTPATIENT)
Dept: ADMINISTRATIVE | Facility: CLINIC | Age: 86
End: 2021-05-26

## 2021-05-27 ENCOUNTER — RECORDS - HEALTHEAST (OUTPATIENT)
Dept: ADMINISTRATIVE | Facility: CLINIC | Age: 86
End: 2021-05-27

## 2021-05-27 ENCOUNTER — OFFICE VISIT (OUTPATIENT)
Dept: OPHTHALMOLOGY | Facility: CLINIC | Age: 86
End: 2021-05-27
Attending: OPHTHALMOLOGY
Payer: COMMERCIAL

## 2021-05-27 DIAGNOSIS — H25.813 MIXED TYPE AGE-RELATED CATARACT, BOTH EYES: ICD-10-CM

## 2021-05-27 DIAGNOSIS — Z98.890 H/O LASER ASSISTED IN SITU KERATOMILEUSIS: ICD-10-CM

## 2021-05-27 DIAGNOSIS — S05.01XA ABRASION OF RIGHT CORNEA, INITIAL ENCOUNTER: Primary | ICD-10-CM

## 2021-05-27 DIAGNOSIS — H04.123 DRY EYE SYNDROME OF BOTH EYES: ICD-10-CM

## 2021-05-27 DIAGNOSIS — H02.889 MEIBOMIAN GLAND DYSFUNCTION: ICD-10-CM

## 2021-05-27 DIAGNOSIS — H25.813 COMBINED FORM OF AGE-RELATED CATARACT, BOTH EYES: ICD-10-CM

## 2021-05-27 PROCEDURE — G0463 HOSPITAL OUTPT CLINIC VISIT: HCPCS

## 2021-05-27 PROCEDURE — 68761 CLOSE TEAR DUCT OPENING: CPT | Mod: E4 | Performed by: OPHTHALMOLOGY

## 2021-05-27 PROCEDURE — 99204 OFFICE O/P NEW MOD 45 MIN: CPT | Mod: 25 | Performed by: OPHTHALMOLOGY

## 2021-05-27 PROCEDURE — 68761 CLOSE TEAR DUCT OPENING: CPT | Mod: 50 | Performed by: STUDENT IN AN ORGANIZED HEALTH CARE EDUCATION/TRAINING PROGRAM

## 2021-05-27 PROCEDURE — 68761 CLOSE TEAR DUCT OPENING: CPT | Mod: E2 | Performed by: OPHTHALMOLOGY

## 2021-05-27 RX ORDER — ROSUVASTATIN CALCIUM 10 MG/1
5 TABLET, COATED ORAL
COMMUNITY
Start: 2021-04-16 | End: 2021-12-09

## 2021-05-27 RX ORDER — POTASSIUM CHLORIDE 1500 MG/1
20 TABLET, EXTENDED RELEASE ORAL
COMMUNITY
Start: 2019-11-10

## 2021-05-27 RX ORDER — MINERAL OIL, PETROLATUM 425; 573 MG/G; MG/G
OINTMENT OPHTHALMIC
Qty: 3.5 G | Refills: 3 | Status: SHIPPED | OUTPATIENT
Start: 2021-05-27

## 2021-05-27 RX ORDER — HYDROCHLOROTHIAZIDE 12.5 MG/1
12.5 TABLET ORAL
COMMUNITY
Start: 2020-01-20 | End: 2021-12-09

## 2021-05-27 RX ORDER — MOXIFLOXACIN 5 MG/ML
SOLUTION/ DROPS OPHTHALMIC
COMMUNITY
Start: 2021-05-26

## 2021-05-27 RX ORDER — ALPRAZOLAM 1 MG/1
1 TABLET, EXTENDED RELEASE ORAL 2 TIMES DAILY
COMMUNITY
Start: 2021-05-12

## 2021-05-27 RX ORDER — TIOTROPIUM BROMIDE 18 UG/1
CAPSULE ORAL; RESPIRATORY (INHALATION)
COMMUNITY
Start: 2020-12-22

## 2021-05-27 RX ORDER — LOSARTAN POTASSIUM 100 MG/1
TABLET ORAL
COMMUNITY
Start: 2020-12-18

## 2021-05-27 RX ORDER — INHALER, ASSIST DEVICES
SPACER (EA) MISCELLANEOUS
COMMUNITY
Start: 2019-07-07 | End: 2021-12-09

## 2021-05-27 RX ORDER — MOXIFLOXACIN 5 MG/ML
1 SOLUTION/ DROPS OPHTHALMIC 4 TIMES DAILY
Qty: 3 ML | Refills: 0 | Status: SHIPPED | OUTPATIENT
Start: 2021-05-27 | End: 2021-05-27

## 2021-05-27 RX ORDER — ALBUTEROL SULFATE 0.83 MG/ML
2.5 SOLUTION RESPIRATORY (INHALATION)
COMMUNITY
Start: 2019-11-06

## 2021-05-27 RX ORDER — CETIRIZINE HYDROCHLORIDE 10 MG/1
10 TABLET ORAL
COMMUNITY
Start: 2020-07-10

## 2021-05-27 RX ORDER — ATENOLOL 25 MG/1
25 TABLET ORAL
COMMUNITY
Start: 2020-01-20

## 2021-05-27 RX ORDER — MULTIVITAMIN
1 CAPSULE ORAL
COMMUNITY

## 2021-05-27 RX ORDER — MINERAL OIL, PETROLATUM 425; 573 MG/G; MG/G
OINTMENT OPHTHALMIC
Qty: 3.5 G | Refills: 3 | Status: SHIPPED | OUTPATIENT
Start: 2021-05-27 | End: 2021-05-27

## 2021-05-27 RX ORDER — AMLODIPINE BESYLATE 5 MG/1
5 TABLET ORAL
COMMUNITY
Start: 2020-01-20 | End: 2021-12-09

## 2021-05-27 RX ORDER — MOXIFLOXACIN 5 MG/ML
1 SOLUTION/ DROPS OPHTHALMIC 4 TIMES DAILY
Qty: 3 ML | Refills: 0 | Status: SHIPPED | OUTPATIENT
Start: 2021-05-27

## 2021-05-27 RX ORDER — PANTOPRAZOLE SODIUM 40 MG/1
40 TABLET, DELAYED RELEASE ORAL
COMMUNITY
End: 2021-12-09

## 2021-05-27 RX ORDER — VITAMIN E 268 MG
400 CAPSULE ORAL
COMMUNITY

## 2021-05-27 ASSESSMENT — VISUAL ACUITY
OD_SC: 20/500
METHOD: SNELLEN - LINEAR
OS_SC: 20/300

## 2021-05-27 ASSESSMENT — CONF VISUAL FIELD
METHOD: COUNTING FINGERS
OS_NORMAL: 1
OD_NORMAL: 1

## 2021-05-27 ASSESSMENT — TONOMETRY
OS_IOP_MMHG: 08
OD_IOP_MMHG: 08
IOP_METHOD: ICARE

## 2021-05-27 ASSESSMENT — EXTERNAL EXAM - LEFT EYE: OS_EXAM: NORMAL

## 2021-05-27 ASSESSMENT — EXTERNAL EXAM - RIGHT EYE: OD_EXAM: NORMAL

## 2021-05-27 NOTE — PATIENT INSTRUCTIONS
Continue moxifloxacin - 1 drop, 4 x daily (breakfast, lunch, dinner, and bedtime) - right eye only    Start preservative -free artificial tears every 1 to 2 hours, both eyes    Start artificial tear ointment at bedtime, both eyes    Stay out of contact lenses until further     Avoid eyeliner makeup     Start warm compresses 2 to 3 times daily for 5 minutes

## 2021-05-27 NOTE — PROGRESS NOTES
I have confirmed the patient's and reviewed Past Medical History, Past Surgical History, Social History, Family History, Problem List, Medication List and agree with Tech note.    CC: right corneal epithelial defect    HPI: Arabella Suero is a 85 year old female w/ hx of LASIK (2001) each eye and CTL wear who presents for evaluation of right corneal epithelial defect sent from Dr. Jolley of [facility]. Last seen by Dr. Jolley there yesterday and was started on Vigamox QID right eye only. Pt reports this has helped with discomfort.  She has worn CTL's 25 years, but not in the past 2 weeks.     Per records from [facility], pt's uncorrected vision right eye was 20/100 ph 20/50 right eye and 20/40 left eye. Vision yesterday was 20/300 uncorrected w/ each eye.     Impression/Plan:    (S05.01XA) Abrasion of right cornea, initial encounter - Right Eye  (primary encounter diagnosis)  Per review of records from [facility], the right cornea had a 3.4 mm x 4.3 mm epi defect with underlying 3+ D-folds. There was stromal haze w/o infiltrate. Today's exam is stable w/o infiltrate or ulcer. LASIK flaps intact w/o involvement.  -Continue moxifloxacin - 1 drop, 4 x daily, right eye only  -Start preservative -free artificial tears every 1 to 2 hours, both eyes  -Start artificial tear ointment at bedtime, both eyes  -Stay out of contact lenses until further   -Avoid eyeliner makeup     (H04.123) Dry eye syndrome of both eyes - Both Eyes  (H02.889) Meibomian gland dysfunction - Both Eyes  Very dry each eye; likely etiology for epi defect.   - Punctal plugs placed - 4 mm collagen in right upper and lower and left lower. Silicone plugs attempted, but unable to accomplish placement d/t floppy nature.   - PF AT's q 1-2 hours. AT lissette at bedtime each eye.   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 Sooner if needed         Christopher Goetz MD  Department of Ophthalmology - PGY4    Attending Physician Attestation:  Complete documentation of historical and exam elements from today's encounter can be found in the full encounter summary report (not reduplicated in this progress note).  I personally obtained the chief complaint(s) and history of present illness.  I confirmed and edited as necessary the review of systems, past medical/surgical history, family history, social history, and examination findings as documented by others; and I examined the patient myself.  I personally reviewed the relevant tests, images, and reports as documented above.  I formulated and edited as necessary the assessment and plan and discussed the findings and management plan with the patient and family. .Attending Physician Procedure Attestation: I was present for the entire procedure     - Socrates Terry MD

## 2021-05-27 NOTE — LETTER
5/27/2021       RE: Arabella Suero  [address]  [place] [address]     Dear Colleague,    Thank you for referring your patient, Arabella Suero, to the [facility] at [facility]. Please see a copy of my visit note below.    I have confirmed the patient's and reviewed Past Medical History, Past Surgical History, Social History, Family History, Problem List, Medication List and agree with Tech note.    CC: right corneal epithelial defect    HPI: Arabella Suero is a 85 year old female w/ hx of LASIK (2001) each eye and CTL wear who presents for evaluation of right corneal epithelial defect sent from Dr. Jolley of [facility]. Last seen by Dr. Jolley there yesterday and was started on Vigamox QID right eye only. Pt reports this has helped with discomfort.  She has worn CTL's 25 years, but not in the past 2 weeks.     Per records from [facility], pt's uncorrected vision right eye was 20/100 ph 20/50 right eye and 20/40 left eye. Vision yesterday was 20/300 uncorrected w/ each eye.     Impression/Plan:    (S05.01XA) Abrasion of right cornea, initial encounter - Right Eye  (primary encounter diagnosis)  Per review of records from [facility], the right cornea had a 3.4 mm x 4.3 mm epi defect with underlying 3+ D-folds. There was stromal haze w/o infiltrate. Today's exam is stable w/o infiltrate or ulcer. LASIK flaps intact w/o involvement.  -Continue moxifloxacin - 1 drop, 4 x daily, right eye only  -Start preservative -free artificial tears every 1 to 2 hours, both eyes  -Start artificial tear ointment at bedtime, both eyes  -Stay out of contact lenses until further   -Avoid eyeliner makeup     (H04.123) Dry eye syndrome of both eyes - Both Eyes  (H02.889) Meibomian gland dysfunction - Both Eyes  Very dry each eye; likely etiology for epi defect.   - Punctal plugs placed - 4 mm collagen in right upper and lower and left lower. Silicone plugs attempted, but unable to  accomplish placement d/t floppy nature.   - PF AT's q 1-2 hours. AT lissette at bedtime each eye.   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 Sooner if needed         Christopher Goetz MD  Department of Ophthalmology - PGY4    Attending Physician Attestation:  Complete documentation of historical and exam elements from today's encounter can be found in the full encounter summary report (not reduplicated in this progress note).  I personally obtained the chief complaint(s) and history of present illness.  I confirmed and edited as necessary the review of systems, past medical/surgical history, family history, social history, and examination findings as documented by others; and I examined the patient myself.  I personally reviewed the relevant tests, images, and reports as documented above.  I formulated and edited as necessary the assessment and plan and discussed the findings and management plan with the patient and family. .Attending Physician Procedure Attestation: I was present for the entire procedure     - Socrates Terry MD         Telephone Encounter:    - Patient called stating that they did not obtain the Moxifloxacin at [facility] and they didn't have it present. Given this -- sent refills to [facility] instead for pick-up. Advised starting these medications right away once obtained.     Rafael Conklin MD  Department of Ophthalmology  Pager: [phone]      Again, thank you for allowing me to participate in the care of your patient.      Sincerely,    Christopher Goetz MD

## 2021-05-27 NOTE — PROGRESS NOTES
Telephone Encounter:    - Patient called stating that they did not obtain the Moxifloxacin at [facility] and they didn't have it present. Given this -- sent refills to [facility] instead for pick-up. Advised starting these medications right away once obtained.     Rafael Conklin MD  Department of Ophthalmology  Pager: [phone]

## 2021-05-27 NOTE — NURSING NOTE
"Chief Complaint(s) and History of Present Illness(es)     Corneal Ulcer Evaluation     In right eye.  Associated symptoms include redness, tearing and burning.  Negative for dryness and eye pain.  Pain was noted as 0/10.              Comments     Right eye corneal ulcer sent over by Dr. Zoran Jolley from [facility] (pt was seen in their clinic yesterday). Pt notes some intermittent burning and stinging x the last year. Just this past weekend pt was having a lot of burning which prompted her to go to the ER at [facility] and [facility] ER well. Pt at one point was given an ointment for her eyes, but notes it \"cemented\" her eyes shut and made the burning worse. Pt was also given a drop as well, no help with that. After all that pt decided to see her regular eye doctor and was told she has a corneal ulcer on the Right cornea. Dr. Jolley prescribed a drop that did help make the RE feel better.    Pt is a soft contact lens wearer. Pt has been out of her CL x the last 2 weeks.     Ocular meds:  Moxifloxacin QID RE yesterday and only 1x day.     Tamara Lerma, COMT 1:27 PM May 27, 2021                     "

## 2021-05-29 ENCOUNTER — COMMUNICATION - HEALTHEAST (OUTPATIENT)
Dept: SCHEDULING | Facility: CLINIC | Age: 86
End: 2021-05-29

## 2021-05-30 ENCOUNTER — RECORDS - HEALTHEAST (OUTPATIENT)
Dept: ADMINISTRATIVE | Facility: CLINIC | Age: 86
End: 2021-05-30

## 2021-05-31 VITALS — HEIGHT: 59 IN | WEIGHT: 114.2 LBS | BODY MASS INDEX: 23.02 KG/M2

## 2021-05-31 VITALS — HEIGHT: 59 IN | WEIGHT: 109 LBS | BODY MASS INDEX: 21.97 KG/M2

## 2021-05-31 VITALS — WEIGHT: 114 LBS | BODY MASS INDEX: 22.98 KG/M2 | HEIGHT: 59 IN

## 2021-05-31 VITALS — BODY MASS INDEX: 22.98 KG/M2 | HEIGHT: 59 IN | WEIGHT: 114 LBS

## 2021-06-01 ENCOUNTER — RECORDS - HEALTHEAST (OUTPATIENT)
Dept: ADMINISTRATIVE | Facility: CLINIC | Age: 86
End: 2021-06-01

## 2021-06-01 VITALS — HEIGHT: 59 IN | BODY MASS INDEX: 23.59 KG/M2 | WEIGHT: 117 LBS

## 2021-06-01 VITALS — HEIGHT: 59 IN | WEIGHT: 114 LBS | BODY MASS INDEX: 22.98 KG/M2

## 2021-06-01 VITALS — HEIGHT: 59 IN | WEIGHT: 117 LBS | BODY MASS INDEX: 23.59 KG/M2

## 2021-06-02 ENCOUNTER — RECORDS - HEALTHEAST (OUTPATIENT)
Dept: ADMINISTRATIVE | Facility: CLINIC | Age: 86
End: 2021-06-02

## 2021-06-02 VITALS — HEIGHT: 59 IN | WEIGHT: 116 LBS | BODY MASS INDEX: 23.39 KG/M2

## 2021-06-02 NOTE — TELEPHONE ENCOUNTER
Describe your symptoms: Rash all over her face  Any pain: no  New/Ongoing: New  How long have you been having symptoms: 5 days  Have you been seen for this:  No.  Appointment offered? No appointments avaialble, transferred to patient to site.  Triage offered?: patient declined  Home remedies tried: Patient put Cortizone cream.  Triamcinolone ACE  Pharmacy Name and Location: [facility].  Okay to leave a detailed message? Yes

## 2021-06-02 NOTE — TELEPHONE ENCOUNTER
I would recommend that the patient either see a dentist who does her partials to check for fit or I am happy to refer her to ENT for further evaluation.    BB

## 2021-06-02 NOTE — TELEPHONE ENCOUNTER
Medication Request  Medication name:   atenolol (TENORMIN) 25 MG tablet        Sig - Route: Take 25 mg by mouth daily. - Oral    Class: Historical Med        Pharmacy Name and Location: [facility]  Reason for request: Patient will be out of medication in a few days.   When did you use medication last?:  Last night  Patient offered appointment:  yes, patient declined and Patient was just seen 10/7/19 to establish care  Okay to leave a detailed message: yes

## 2021-06-02 NOTE — TELEPHONE ENCOUNTER
"Spoke to pt, relayed both messages to pt.  She states she is feeling improved, sinus pressure and cough have improved.  She does not feel she needs the referral to ENT yet. She was just in to see dentist, they said partials are fitting \"just fine\". She feels the mouth lesion has improved. If anything changes, she will call back for referral if needed.  "

## 2021-06-02 NOTE — PROGRESS NOTES
Assessment/Plan:    Arabella Suero is a 84 y.o. female presenting for:    1. Chronic frontal sinusitis   doxycycline sent to the pharmacy given that she has had some sinus symptoms for the last 3 weeks.  This could be allergies and I discussed with her starting an allergy medication as well.  She will contact me if she is not feeling improved over the next few weeks.  - doxycycline (MONODOX) 100 MG capsule; Take 1 capsule (100 mg total) by mouth 2 (two) times a day.  Dispense: 20 capsule; Refill: 0    2. Pulmonary emphysema, unspecified emphysema type (H)  Medrol Dosepak was sent to the pharmacy.  I do not think that she needs to start this at this point but she states that she often will get short of breath when going to a more humid climate.  She will bring it with her on her vacation and take if needed.  We discussed very specifically the times to take the medication as well as the risks of steroids.  - methylPREDNISolone (MEDROL DOSEPACK) 4 mg tablet; Take 1 tablet (4 mg total) by mouth daily for 6 days. Follow package directions  Dispense: 21 tablet; Refill: 0      With regards to her mouth ulcer I think this is most likely due to rubbing of her partials on her refill of her mouth.  I encouraged her to see the dentist.    There are no discontinued medications.        Chief Complaint:  Chief Complaint   Patient presents with     Establish Care     Mouth Lesions     Sore in her mouth thinking it is from the inhalers they gave her in the hospital       Subjective:   Arabella Suero is a pleasant 84-year-old patient presented to the clinic today to establish care as well as to discuss some issues.    The patient is unfortunately somewhat of a difficult historian.  From her chart review and review of medications she has a history of hypertension, hypercholesterolemia, coronary artery disease (being followed by cardiology) and emphysema.    Although I do not have records of this it sounds as though she was at urgent care  "about a month ago and was treated for a \"upper respiratory infection\" with some nebulizers in the emergency department as well as some antibiotics.  The patient is unclear what antibiotic she was on.  She feels as though the nebulized treatments may be gave her sore in her mouth.  She has partial dentures in the sores on the top of her mouth.    Unfortunately the patient seems to change her story throughout her visit today but it sounds as though she took the antibiotics and did not think that they helped that much.  She notes that she is having some sinus pressure.  She also notes that she has having some shortness of breath.  She is going on vacation sometime in the next few weeks [place]  Generally with these infections she will require antibiotics and prednisone per her report which is what she is hoping to get today.    She otherwise begins to tell me about her past history of some depression.  Apparently her son  at age 17 but again the circumstances are bit unclear.  This was always a quite some time ago and patient states that she has \"dealt well\" with the loss at this point.    12 point review of systems completed and negative except for what has been described above    Social History     Tobacco Use   Smoking Status Former Smoker     Last attempt to quit: 1977     Years since quittin.2   Smokeless Tobacco Never Used       Current Outpatient Medications   Medication Sig     ALPRAZolam (XANAX XR) 1 MG 24 hr tablet Take 1 mg by mouth daily.     ALPRAZolam (XANAX) 0.5 MG tablet Take 0.5 mg by mouth 4 (four) times a day as needed for anxiety.      amLODIPine (NORVASC) 10 MG tablet Take 0.5 tablets (5 mg total) by mouth at bedtime.     aspirin 81 MG EC tablet Take 81 mg by mouth daily.     atenolol (TENORMIN) 25 MG tablet Take 25 mg by mouth daily.     cholecalciferol, vitamin D3, (VITAMIN D3) 2,000 unit cap Take 2,000 Units by mouth " "daily.     fluticasone (FLONASE) 50 mcg/actuation nasal spray 1 spray into each nostril as needed for rhinitis.     hydroCHLOROthiazide (HYDRODIURIL) 12.5 MG tablet Take 12.5 mg by mouth daily.     losartan (COZAAR) 100 MG tablet Take 100 mg by mouth daily.     multivitamin capsule Take 1 capsule by mouth daily.     pantoprazole (PROTONIX) 40 MG tablet Take 40 mg by mouth daily.     potassium chloride SA (K-DUR,KLOR-CON) 20 MEQ tablet Take 20 mEq by mouth daily.     rosuvastatin (CRESTOR) 5 MG tablet TAKE ONE TABLET BY MOUTH ONCE DAILY AT BEDTIME     vitamin E 400 UNIT capsule Take 400 Units by mouth daily.     doxycycline (MONODOX) 100 MG capsule Take 1 capsule (100 mg total) by mouth 2 (two) times a day.         Objective:  Vitals:    10/07/19 1414   BP: 132/60   Pulse: 64   Resp: 12   Temp: 97.8  F (36.6  C)   TempSrc: Oral   Weight: 109 lb 7 oz (49.6 kg)   Height: 4' 11\" (1.499 m)     Oxygen saturation: 97% on room air    Body mass index is 22.1 kg/m .    Vital signs reviewed and stable  General: No acute distress  Psych: Appropriate affect  HEENT: moist mucous membranes, pupils equal, round, reactive to light and accomodation, posterior oropharynx clear of erythema or exudate, tympanic membranes are pearly grey bilaterally, patient does have a aphthous ulcer on the roof of her mouth on the left  Lymph: no cervical or supraclavicular lymphadenopathy  Cardiovascular: regular rate and rhythm with no murmur  Pulmonary: Mild wheezing bilaterally with good air movement throughout   abdomen: soft, non tender, non distended with normo-active bowel sounds  Extremities: warm and well perfused with no edema  Skin: warm and dry with no rash         This note has been dictated and transcribed using voice recognition software.   Any errors in transcription are unintentional and inherent to the software.  "

## 2021-06-02 NOTE — TELEPHONE ENCOUNTER
"Spoke to pt, states she woke up with a rash/swollen face yesterday. Has cortisone cream from her dermatologist, so he applied that and it has taken the puffiness down quite a bit. She says rash does not look like hives, but more of a swelling and small, tiny bumps on face only.  She stopped taking the doxycycline after her morning dose yesterday-hasn't taken since.  Still taking medrol dose pack, last does today she states.    Sore in her mouth is painful. Feels nauseated. States, \"I just don't feel good at all. Just worn down.\"    Rash much better today than yesterday.  Last seen 10/7/19    ? Reaction to doxycycline?  "

## 2021-06-02 NOTE — TELEPHONE ENCOUNTER
I think she can stay off of the doxycycline.  She has taken a few days.  Please have her let me know if she continues to have severe sinus pressure or if she has any fevers.  Hopefully her cough has improved with the Medrol Dosepak.     EULALIA

## 2021-06-03 VITALS
SYSTOLIC BLOOD PRESSURE: 132 MMHG | WEIGHT: 109.44 LBS | DIASTOLIC BLOOD PRESSURE: 60 MMHG | RESPIRATION RATE: 12 BRPM | TEMPERATURE: 97.8 F | HEIGHT: 59 IN | HEART RATE: 64 BPM | BODY MASS INDEX: 22.06 KG/M2

## 2021-06-03 NOTE — TELEPHONE ENCOUNTER
"Over the past few days after eating anything has \"really bad acid reflux\".   \"now the tops of my hands my vessels are hurting, my veins\"   Attempted to go to [employer] today but was unable to tolerate the \"acid reflux\" and came home.   Pt called and scheduled an appointment earlier today for Wednesday, 12/4, but is \"scared\" because symptoms are worsening, unsure she will be able to tolerate the pain through the night again.     \"top of my stomach, right in the middle of my chest\"  Pt reports she has not had Acid reflux in the past- noted to be on protonix on current medication list.   Pain is not present when she wakes up in the morning, but one drink of anything and pain starts. Pain remains throughout the day.   Pt attempted to eat [employer] \"fish\". Pt reports she was unable to eat this because of the pain.   No shortness of breath.     Pt's  is home and will take patient to [facility] ER now.     Jaci AlvarezRN [facility] Triage      Reason for Disposition    Pain lasting > 10 minutes and over 50 years old    Protocols used: ABDOMINAL PAIN - UPPER-A-OH      "

## 2021-06-03 NOTE — PROGRESS NOTES
[facility] Note    Name: Arabella Suero  : 1935   MRN: [id number]    Arabella Suero is a 84 y.o. female presenting to discuss the following:     CC:   Chief Complaint   Patient presents with     COPD       HPI:  COPD:   Previously seen at [place], told due to second hand smoke exposure has COPD. Was doing really well until recently had a chest cold. Went to urgent care. Had a CT scan. On papers when left, was told she has emphysema. 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 Currently asymptomatic.   mMRC score 0  No hospitalizations, no recurrent exacerbations  GOLD criteria A    RASH:   Previously went to dermatology, was given a topical steroid. Used on red, itchy rash bilateral lower legs and has now resolved.    STOMACH SYMPTOMS:   Every time she eats, can't eat very much, feels sick to her stomach. Has pain in LUQ and epigastric region. Wonders if related to her bowels. Gets better after going to the bathroom. She may be taking protonix for heartburn symptoms. She had a normal gastric emptying study in 2018.     HEALTH MAINTENANCE:   - flu: states she had flu shot this year  - tetanus: states she had this year     ROS:   See HPI above.    PMH:   Patient Active Problem List   Diagnosis     Essential hypertension     Hypercholesterolemia     Cerebrovascular accident (CVA) due to embolism (H)     Hypokalemia     Hyponatremia     Diarrhea due to drug     Coronary artery disease due to calcified coronary lesion     Vitamin D deficiency     Trochanteric bursitis of left hip     Osteoarthritis of hand     Fatigue     Enthesopathy of hip region     Chest wall pain     Carpal tunnel syndrome     Anxiety, generalized       Past Medical History:   Diagnosis Date     Cancer (H)      GERD (gastroesophageal reflux disease)      Hyperlipidemia      Hypertension        PSH:   Past Surgical History:   Procedure Laterality Date     ABDOMINAL SURGERY       BREAST  SURGERY       carpel tunnel -left Left      HYSTERECTOMY         MEDICATIONS:   Current Outpatient Medications on File Prior to Visit   Medication Sig Dispense Refill     albuterol (PROVENTIL) 2.5 mg /3 mL (0.083 %) nebulizer solution USE 1 VIAL IN NEBULIZER EVERY 6 HOURS  2     ALPRAZolam (XANAX XR) 1 MG 24 hr tablet Take 1 mg by mouth daily.       ALPRAZolam (XANAX) 0.5 MG tablet Take 0.5 mg by mouth 4 (four) times a day as needed for anxiety.        amLODIPine (NORVASC) 10 MG tablet Take 0.5 tablets (5 mg total) by mouth at bedtime. 30 tablet 11     aspirin 81 MG EC tablet Take 81 mg by mouth daily.       atenolol (TENORMIN) 25 MG tablet Take 1 tablet (25 mg total) by mouth daily. 90 tablet 3     cholecalciferol, vitamin D3, (VITAMIN D3) 2,000 unit cap Take 2,000 Units by mouth daily.       doxycycline (MONODOX) 100 MG capsule Take 1 capsule (100 mg total) by mouth 2 (two) times a day. 20 capsule 0     fluticasone (FLONASE) 50 mcg/actuation nasal spray 1 spray into each nostril as needed for rhinitis.       hydroCHLOROthiazide (HYDRODIURIL) 12.5 MG tablet Take 12.5 mg by mouth daily.       losartan (COZAAR) 100 MG tablet Take 100 mg by mouth daily.       MICROCHAMBER Spcr FOR HOME USE WITH INHALER  0     multivitamin capsule Take 1 capsule by mouth daily.       potassium chloride (K-DUR,KLOR-CON) 20 MEQ tablet Take 1 tablet (20 mEq total) by mouth daily. 30 tablet 3     rosuvastatin (CRESTOR) 5 MG tablet TAKE ONE TABLET BY MOUTH ONCE DAILY AT BEDTIME 90 tablet 1     VENTOLIN HFA 90 mcg/actuation inhaler INHALE 2 PUFFS BY MOUTH 4 TIMES DAILY  0     vitamin E 400 UNIT capsule Take 400 Units by mouth daily.       pantoprazole (PROTONIX) 40 MG tablet Take 40 mg by mouth daily.       No current facility-administered medications on file prior to visit.      ALLERGIES:  Allergies   Allergen Reactions     Other Drug Allergy (See Comments)      Antidepressants- drowsiness or hyper       FAMHx:  Family History   Problem  "Relation Age of Onset     Congenital heart disease Father        SOCIAL HISTORY:   Social History     Tobacco Use     Smoking status: Former Smoker     Last attempt to quit: 1977     Years since quittin.3     Smokeless tobacco: Never Used   Substance Use Topics     Alcohol use: No     Drug use: No       PHYSICAL EXAM:   /68   Pulse (!) 59   Temp 97.5  F (36.4  C) (Oral)   Resp 20   Ht 4' 11\" (1.499 m)   Wt 106 lb 9 oz (48.3 kg)   SpO2 99%   BMI 21.52 kg/m     GENERAL: Arabella is a pleasant, elderly female, in no acute distress. She is thin, appears stated age.   HEENT: Sclera white, no nasal discharge.   HEART: Regular rate and rhythm, no murmurs.   LUNGS: Clear to auscultation bilaterally, unlabored. Diminished breath sounds bilaterally. No wheezing.  ABDOMEN: Soft, non-tender to palpation. No palpable masses.     ASSESSMENT & PLAN:   Arabella Suero is a 84 y.o. female presenting today for clarification of COPD diagnosis, concerns for rash now resolved, and LUQ abdominal pain relieved by bowel movements.    She has a long list of medications, doesn't know what she is taking (unable to reconcile medications), and states she is taking some medications that are not listed. Requested she review her AVS, review list of medications, and contact the clinic with corrections.    1. Lung disease  ED visit from 2019 through [facility] reports CTA chest with moderate centrilobular emphysema. She has previous PFTs from [place], but results are not available. She has albuterol on her list, states she also takes Spiriva but do not see prescription for this. mMRC score is 0. She has not had any hospitalizations for breathing concerns in last year. She was diagnosed with an upper respiratory infection and started on albuterol. 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    Recommended clarifying diagnosis today. Will attempt to " obtain PFT records from [place]. If unable to obtain these, will discuss repeat testing through [facility] pulmonary clinic. If she has COPD, she is in group A and management with albuterol PRN is appropriate.    2. LUQ abdominal pain  She had a normal gastric emptying study in March 2018. Symptoms in LUQ improved after bowel movement. Reports intermittent symptoms of constipation. Symptoms may be due to constipation and stool burden. No abnormalities identified on exam today. Recommended Miralax, titrated as needed to have 1 soft stool per day. If not improving, return for further evaluation.      HM: States she had flu shot and tetanus booster already this year. Not in our records. Recommended she check her records and return if due.    RTC: 1 month - medicare annual wellness exam, follow up breathing and abdomen symptoms.     Johnna Boone DO

## 2021-06-03 NOTE — PATIENT INSTRUCTIONS - HE
Constipation: Use miralax as needed for the next few weeks.    Check list of medications. Let us know what you are taking and what you are not taking.    We will contact [facility] for your pulmonary function tests and will discuss these results.

## 2021-06-04 VITALS
DIASTOLIC BLOOD PRESSURE: 70 MMHG | WEIGHT: 103.19 LBS | TEMPERATURE: 97.5 F | HEIGHT: 58 IN | BODY MASS INDEX: 21.66 KG/M2 | SYSTOLIC BLOOD PRESSURE: 138 MMHG | OXYGEN SATURATION: 99 % | RESPIRATION RATE: 16 BRPM | HEART RATE: 76 BPM

## 2021-06-04 VITALS
OXYGEN SATURATION: 97 % | HEIGHT: 59 IN | RESPIRATION RATE: 12 BRPM | WEIGHT: 105.06 LBS | BODY MASS INDEX: 21.18 KG/M2 | HEART RATE: 68 BPM | SYSTOLIC BLOOD PRESSURE: 124 MMHG | TEMPERATURE: 97.3 F | DIASTOLIC BLOOD PRESSURE: 72 MMHG

## 2021-06-04 VITALS — HEIGHT: 58 IN | WEIGHT: 112 LBS | BODY MASS INDEX: 23.51 KG/M2

## 2021-06-04 VITALS
DIASTOLIC BLOOD PRESSURE: 89 MMHG | HEIGHT: 59 IN | BODY MASS INDEX: 21.67 KG/M2 | WEIGHT: 107.5 LBS | SYSTOLIC BLOOD PRESSURE: 148 MMHG | TEMPERATURE: 96.7 F | HEART RATE: 74 BPM | RESPIRATION RATE: 16 BRPM

## 2021-06-04 VITALS
TEMPERATURE: 97.5 F | SYSTOLIC BLOOD PRESSURE: 138 MMHG | RESPIRATION RATE: 20 BRPM | HEIGHT: 59 IN | HEART RATE: 59 BPM | BODY MASS INDEX: 21.48 KG/M2 | OXYGEN SATURATION: 99 % | DIASTOLIC BLOOD PRESSURE: 68 MMHG | WEIGHT: 106.56 LBS

## 2021-06-04 VITALS
HEART RATE: 68 BPM | SYSTOLIC BLOOD PRESSURE: 128 MMHG | BODY MASS INDEX: 21.57 KG/M2 | HEIGHT: 59 IN | DIASTOLIC BLOOD PRESSURE: 78 MMHG | RESPIRATION RATE: 16 BRPM | WEIGHT: 107 LBS

## 2021-06-04 VITALS
BODY MASS INDEX: 23.51 KG/M2 | SYSTOLIC BLOOD PRESSURE: 138 MMHG | DIASTOLIC BLOOD PRESSURE: 72 MMHG | WEIGHT: 112 LBS | HEART RATE: 67 BPM | HEIGHT: 58 IN

## 2021-06-04 NOTE — TELEPHONE ENCOUNTER
"Forms were faxed to [phone].  Pt was notified forms were faxed.  Copy in \"jic\" and sent to scan for chart.  "

## 2021-06-04 NOTE — PROGRESS NOTES
Assessment/Plan:    Arabella Suero is a 84 y.o. female presenting for:    1. Acute recurrent maxillary sinusitis  Given her sinus congestion and thick mucus for the last 2 weeks I think it is reasonable to start her on an antibiotic.  Augmentin sent to the pharmacy.  Discussed risks and most common side effects of the medication.  Discussed possibly trying an allergy medication as well and she will consider this.  - amoxicillin-clavulanate (AUGMENTIN) 875-125 mg per tablet; Take 1 tablet by mouth 2 (two) times a day for 10 days.  Dispense: 20 tablet; Refill: 0    2. Shortness of breath  Her most recent CT scan showed moderate emphysema.  Unfortunately we were unable to get the records from the [place] pulmonologist from over 20 years ago.  The patient states that she would like to see a pulmonologist which I think is reasonable.  Please see Dr. Boone's excellent risk stratification progress note from November 2019.  mMRC score is 0. She has not had any hospitalizations for breathing concerns in last year.  She recently got a prescription for Spiriva but has not started this yet.  I do think it would be pertinent to recheck PFTs however she would like to see a specialist prior to doing so.  We will again send for her records from [place].        There are no discontinued medications.        Chief Complaint:  Chief Complaint   Patient presents with     Hospital Visit Follow Up     [place] 12/13/19 SOB and Chest pain     Nasal Congestion     Thick mucus in her throat- afraid to sleep thinking she might choke- sxs x 1 month       Subjective:   Arabella Suero is an 84-year-old female presenting to the clinic today for a emergency room follow-up.  Patient was seen at [facility] emergency department on December 13 for concerns for her breathing.  Patient was actually being seen at [place] orth[facility] for wrist pain.  She also mentions some back pain and a x-ray was done at the orthopedic clinic.  Concern was noted for decreased lung  markings and she was sent to the emergency department for further evaluation.  In the emergency department a CTA was done which was negative for pulmonary emboli or dissections but did show moderate emphysema.    She presents to the clinic today for follow-up.  She states that she continues to have severe nasal congestion which has been going on for about 2 weeks.  She states when she wakes up in the middle the night she will feel like she cannot breathe due to the nasal drainage.  She is coughing up significant greenish sputum.  She does not think she is had fevers but has been feeling very tired recently.  She also feels as though she has been having significant pressure in her sinuses.    12 point review of systems completed and negative except for what has been described above    Social History     Tobacco Use   Smoking Status Former Smoker     Last attempt to quit: 1977     Years since quittin.4   Smokeless Tobacco Never Used       Current Outpatient Medications   Medication Sig     albuterol (PROVENTIL) 2.5 mg /3 mL (0.083 %) nebulizer solution Take 2.5 mg by nebulization every 6 (six) hours as needed.      ALPRAZolam (XANAX XR) 1 MG 24 hr tablet Take 1 tablet (1 mg total) by mouth 2 (two) times a day. NOON and BEDTIME     ALPRAZolam (XANAX) 0.5 MG tablet Take 1 tablet (0.5 mg total) by mouth daily as needed for anxiety.     amLODIPine (NORVASC) 5 MG tablet Take 1 tablet (5 mg total) by mouth at bedtime.     aspirin 81 MG EC tablet Take 81 mg by mouth daily.     atenolol (TENORMIN) 25 MG tablet Take 1 tablet (25 mg total) by mouth daily.     cholecalciferol, vitamin D3, (VITAMIN D3) 2,000 unit cap Take 2,000 Units by mouth daily.     cyclobenzaprine (FLEXERIL) 10 MG tablet Take 1 tablet (10 mg total) by mouth 2 (two) times a day as needed for muscle spasms.     fluticasone (FLONASE) 50 mcg/actuation nasal spray 1 spray into each nostril as needed for rhinitis.     hydroCHLOROthiazide (HYDRODIURIL)  "12.5 MG tablet Take 12.5 mg by mouth daily.     losartan (COZAAR) 100 MG tablet Take 100 mg by mouth daily.     MICROCHAMBER Spcr FOR HOME USE WITH INHALER     multivitamin capsule Take 1 capsule by mouth daily.     pantoprazole (PROTONIX) 40 MG tablet Take 40 mg by mouth daily as needed.      potassium chloride (K-DUR,KLOR-CON) 20 MEQ tablet Take 1 tablet (20 mEq total) by mouth daily.     rosuvastatin (CRESTOR) 5 MG tablet TAKE ONE TABLET BY MOUTH ONCE DAILY AT BEDTIME     tiotropium (SPIRIVA) 18 mcg inhalation capsule Place 1 capsule (2 puffs total) into inhaler and inhale daily.     VENTOLIN HFA 90 mcg/actuation inhaler Inhale 2 puffs every 4 (four) hours as needed.      vitamin E 400 UNIT capsule Take 400 Units by mouth daily.     amoxicillin-clavulanate (AUGMENTIN) 875-125 mg per tablet Take 1 tablet by mouth 2 (two) times a day for 10 days.         Objective:  Vitals:    12/17/19 1020   BP: 124/72   Pulse: 68   Resp: 12   Temp: 97.3  F (36.3  C)   TempSrc: Oral   SpO2: 97%   Weight: 105 lb 1 oz (47.7 kg)   Height: 4' 11\" (1.499 m)       Body mass index is 21.22 kg/m .    Vital signs reviewed and stable  General: No acute distress  Psych: Appropriate affect  HEENT: moist mucous membranes, pupils equal, round, reactive to light and accomodation, posterior oropharynx clear of erythema or exudate, tympanic membranes are pearly grey bilaterally  Lymph: no cervical or supraclavicular lymphadenopathy  Cardiovascular: regular rate and rhythm with no murmur  Pulmonary: clear to auscultation bilaterally with no wheeze  Abdomen: soft, non tender, non distended with normo-active bowel sounds  Extremities: warm and well perfused with no edema  Skin: warm and dry with no rash         This note has been dictated and transcribed using voice recognition software.   Any errors in transcription are unintentional and inherent to the software.  "

## 2021-06-04 NOTE — TELEPHONE ENCOUNTER
Left message for pt to call back.  I spoke to the pharmacy and they said a prior auth is not needed for medication.  Pt already picked up 1mg tablets and it was too early to  0.5mg tablets.    Was there something more pt needed?

## 2021-06-04 NOTE — TELEPHONE ENCOUNTER
Pt came by the clinic today and dropped off a form from [employer] that states pt's Rx Approval for Alprazolam ER is going to  on 2020. VALERIANO printed out form for MD to fill out, MD just needs to complete and sign and CMA will fax to the number listed on the form.     BB- Please advise when you get back into the clinic next week. Form located on your desk for completion. Thank you.

## 2021-06-04 NOTE — TELEPHONE ENCOUNTER
Call from pt       She would like to get refill for SPIRIVA       Reports that she has been on it in the past from Dr Renee ([place]) and reportedly in records from [place] as well      (There is note regarding it in 11/22/2019 encounter)        A/P:   > I will message PCP to review and follow up with this      Pt tele# [phone]        Car Abel, RN   Triage and Medication Refills

## 2021-06-04 NOTE — TELEPHONE ENCOUNTER
Medication Request  Medication name:     1.amLODIPine (NORVASC) 10 MG tablet  Pharmacy Name and Location:     [facility] [address] [place] [address] E  Reason for request: Patient states she takes 5 mg not 10 . Patient states that her hands are unsteady and she can not cut tabs in 1/2 . Please address both medications on this thread and bring resolution to these ongoing request .  Thank You     When did you use medication last?:    Today   Patient offered appointment:  patient declined  Okay to leave a detailed message: yes

## 2021-06-04 NOTE — TELEPHONE ENCOUNTER
Tried to call patient - no answer.     [facility] is not listed as a pharmacy option - is this new? Is a PA needed?

## 2021-06-04 NOTE — TELEPHONE ENCOUNTER
Medication Request  Medication name:    Disp Refills Start End    ALPRAZolam (XANAX) 0.5 MG tablet 15 tablet 0 12/16/2019     Sig - Route: Take 1 tablet (0.5 mg total) by mouth daily as needed for anxiety. - Oral    Sent to pharmacy as: ALPRAZolam 0.5 mg tablet (XANAX)    E-Prescribing Status: Receipt confirmed by pharmacy (12/16/2019  8:41 AM CST)       Disp Refills Start End    ALPRAZolam (XANAX XR) 1 MG 24 hr tablet 60 tablet 0 12/16/2019     Sig - Route: Take 1 tablet (1 mg total) by mouth 2 (two) times a day. NOON and BEDTIME - Oral    Sent to pharmacy as: ALPRAZolam ER 1 mg tablet,extended release 24 hr (XANAX XR)    E-Prescribing Status: Receipt confirmed by pharmacy (12/16/2019  2:44 PM CST)        Pharmacy Name and Location: [facility]  Reason for request: Caller stated that medication needs a new Rx due to its been a year now and will be needing this sent to [facility] mail ( for medication approval ). Caller stated that she does this every year for insurance to cover the medication.   When did you use medication last?:  n/a  Patient offered appointment:  n/a  Okay to leave a detailed message: yes  [phone]

## 2021-06-04 NOTE — TELEPHONE ENCOUNTER
Patient calling -says she had an appointment at the pulmonary clinic today.  Says she just missed a call - they called and left a message asking if she wanted to schedule but she is unsure what she is supposed to schedule.  Advised patient to call the pulmonary office when they are open again in the morning.    Gogo Reyes RN  Triage Nurse Advisor

## 2021-06-04 NOTE — TELEPHONE ENCOUNTER
Medication Request  Medication name: Alprazolam XR 1 mg, one tablet two times a day, #60.  Medication is taken daily for history of anxiety.  Pharmacy Name and Location: [id number]  Reason for request: Current medication  When did you use medication last?:  Yesterday.  She is out of medication now.  Patient offered appointment:  Patient was seen by Dr. Martinez 11/22/19 and this medication was discussed.  Okay to leave a detailed message: yes

## 2021-06-04 NOTE — TELEPHONE ENCOUNTER
Who is calling:  Patient  Reason for Call:  Patient stated she would like her Spiriva 15 mcg sent to [facility] in [facility].  Date of last appointment with primary care: 11/22/19  Okay to leave a detailed message: No

## 2021-06-04 NOTE — TELEPHONE ENCOUNTER
Patient Returning Call  Reason for call:  Patient calling back  Information relayed to patient:  None  Patient has additional questions:  Yes  If YES, what are your questions/concerns:  Patient reports she would like her doctor Rain Martinez MD to take over this medication. Any questions, please call patient!  Okay to leave a detailed message?: Yes

## 2021-06-04 NOTE — TELEPHONE ENCOUNTER
Referral Request  Type of referral: Pulmonary   Who s requesting: Patient  Why the request: Patient has not been seen by pulmonary in a long time and is requesting a referral to have a evaluation as to the stage of her COPD  Have you been seen for this request: Yes  Does patient have a preference on a group/provider? [facility], has been seen there previously  Okay to leave a detailed message?  Yes     Patient does have an appointment on 12/24/19 in PCP clinic

## 2021-06-04 NOTE — TELEPHONE ENCOUNTER
Controlled Substance Refill Request  Medication:   Requested Prescriptions     Pending Prescriptions Disp Refills     ALPRAZolam (XANAX) 0.5 MG tablet  0     Sig: Take 1 tablet (0.5 mg total) by mouth daily as needed for anxiety.     Date Last Fill: 10/26/16- historical  Pharmacy: [facility]   Submit electronically to pharmacy  Controlled Substance Agreement on File:   Encounter-Level CSA Scan Date:    There are no encounter-level csa scan date.       Last office visit: Last office visit pertaining to requested medication was 11/22/19.

## 2021-06-04 NOTE — TELEPHONE ENCOUNTER
Medication Question or Clarification  Who is calling: Patient  What medication are you calling about? (include dose and sig) Amlodipine 10 mg, take 1/2 tablet daily  Who prescribed the medication?: Rain Martinez MD     What is your question/concern?: Patient states for years her previous doctor had her on amlodipine 5 mg tablet, one daily.   The prescription got refilled 10 mg tablet was order with sig of take 1/2 tablet daily.  Why did the 10 mg tablet get ordered?  Please correct her medication list and send a prescription to her pharmacy for the 5 mg tablet, one daily to keep on her profile at the pharmacy. Please put quantity of 90 tablets.    Pharmacy: [id number]    Okay to leave a detailed message?: Yes  Site CMT - Please call the pharmacy to obtain any additional needed information.

## 2021-06-04 NOTE — TELEPHONE ENCOUNTER
Refill Request  Did you contact pharmacy: Yes  Medication name:   Requested Prescriptions     Pending Prescriptions Disp Refills     amLODIPine (NORVASC) 10 MG tablet 30 tablet 11     Sig: Take 0.5 tablets (5 mg total) by mouth at bedtime.     Who prescribed the medication:Dr Ospina  Pharmacy Name and Location:[facility]   Is patient out of medication: Yes  Patient notified refills processed in 72 hours:  yes  Okay to leave a detailed message: no

## 2021-06-04 NOTE — TELEPHONE ENCOUNTER
Refill Request  Did you contact pharmacy: No  Medication name: Alprazolam XR 1 mg (last filled 11/12/2019)     Who prescribed the medication: Dr. Martinez  Pharmacy Name and Location: [facility] on file  Is patient out of medication: Yes  Patient notified refills processed in 72 hours:  yes  Okay to leave a detailed message: yes

## 2021-06-05 VITALS
RESPIRATION RATE: 16 BRPM | WEIGHT: 114 LBS | SYSTOLIC BLOOD PRESSURE: 130 MMHG | HEART RATE: 62 BPM | BODY MASS INDEX: 22.98 KG/M2 | HEIGHT: 59 IN | DIASTOLIC BLOOD PRESSURE: 60 MMHG

## 2021-06-05 NOTE — TELEPHONE ENCOUNTER
Reason contacted:  Xray results  Information relayed:  MD message below relayed to pt  Additional questions:  No  Further follow-up needed:  No  Okay to leave a detailed message:  RAUL

## 2021-06-05 NOTE — TELEPHONE ENCOUNTER
Controlled Substance Refill Request  Medication Name:   Requested Prescriptions     Pending Prescriptions Disp Refills     ALPRAZolam (XANAX) 0.5 MG tablet [Pharmacy Med Name: ALPRAZolam 0.5 MG Oral Tablet] 15 tablet 0     Sig: TAKE 1 TABLET BY MOUTH DAILY AS NEEDED FOR ANXIETY     ALPRAZolam (XANAX XR) 1 MG 24 hr tablet [Pharmacy Med Name: ALPRAZolam ER 1 MG Oral Tablet Extended Release 24 Hour] 60 tablet 0     Sig: TAKE 1 TABLET BY MOUTH TWICE DAILY (NOON AND BEDTIME)     Date Last Fill: 12/16/19  Requested Pharmacy: [facility]  Submit electronically to pharmacy  Controlled Substance Agreement on file:   Encounter-Level CSA Scan Date:    There are no encounter-level csa scan date.        Last office visit:  1/14/20

## 2021-06-05 NOTE — PROGRESS NOTES
Assessment and Plan:   Arabella is a pleasant 84-year-old female presenting to the clinic today for her annual wellness visit as well as several concerns:    1. Essential hypertension  Blood pressure at goal today.  Recheck CMP.  - Comprehensive Metabolic Panel    2. Hypercholesterolemia  Recheck lipid panel  - Lipid Cascade FASTING    3. Anemia, unspecified type  Recheck hemoglobin  - Hemoglobin    4. Cough  Recheck a chest x-ray.  Discussed with patient that her recent chest CT scan was normal and I feel as though the x-ray that was done at [facility] was likely balanced to look at bone and not her lungs which is why her lungs appear dark.  CT scan after the chest x-ray was overall unremarkable so I am not concerned.  Patient states that she is very anxious about this and has requested a repeat chest x-ray which we will do today.  - XR Chest 2 Views    5. Anxiety, generalized  Patient is currently on daily Xanax.  She has tried many other medications in the past per her report.  I will work on getting those records.    6. Encounter for general adult medical examination with abnormal findings  We did discuss her Xanax at length today and how this is a medication that can cause unsteadiness and drowsiness.  We also discussed her chest x-ray/chest CT at length today.  Please see above.    The patient's current medical problems were reviewed.    I have had an Advance Directives discussion with the patient.  The following health maintenance schedule was reviewed with the patient and provided in printed form in the after visit summary:   Health Maintenance   Topic Date Due     ADVANCE CARE PLANNING  09/26/1953     ZOSTER VACCINES (1 of 2) 09/26/1986     MEDICARE ANNUAL WELLNESS VISIT  09/26/2001     DXA SCAN  09/26/2001     TD 18+ HE  09/16/2018     FALL RISK ASSESSMENT  01/14/2021     PNEUMOCOCCAL IMMUNIZATION 65+ LOW/MEDIUM RISK  Completed     INFLUENZA VACCINE RULE BASED  Completed        Subjective:   Chief  "Complaint: Arabella Suero is an 84 y.o. female here for an Annual Wellness visit.   HPI: Arabella is a pleasant 84-year-old female presenting to the clinic today for annual wellness visit.  She is a past medical history significant for anxiety for which she takes 1 Xanax daily.  She states that she has been on many daily medications which have not been beneficial for her or she has had bad side effects with.    Otherwise, she is worried because she had a chest x-ray which was done at [facility] a while back.  The x-ray was actually done to look at her back however her lungs and that x-ray appeared \"dark.\"  She was actually sent to the hospital because she was complaining of some shortness of breath as well and a CT scan of her chest was done which showed moderate emphysema but her lungs overall looked okay.  We have discussed this in the past but patient states that she is very anxious about the fact that her lungs look so dark on that scan.    Review of Systems:   Please see above.  The rest of the review of systems are negative for all systems.    Patient Care Team:  Rain Martinez MD as PCP - General (Family Medicine)  Nikky Oliva MD as Assigned PCP  Rain Martinez MD as Assigned PCP     Patient Active Problem List   Diagnosis     Essential hypertension     Hypercholesterolemia     Cerebrovascular accident (CVA) due to embolism (H)     Coronary artery disease due to calcified coronary lesion     Vitamin D deficiency     Osteoarthritis of hand     Enthesopathy of hip region     Anxiety, generalized     Past Medical History:   Diagnosis Date     Cancer (H)      GERD (gastroesophageal reflux disease)      Hyperlipidemia      Hypertension       Past Surgical History:   Procedure Laterality Date     ABDOMINAL SURGERY       BREAST SURGERY       carpel tunnel -left Left      HYSTERECTOMY        Family History   Problem Relation Age of Onset     Congenital heart disease Father       Social History "     Socioeconomic History     Marital status:      Spouse name: Not on file     Number of children: Not on file     Years of education: Not on file     Highest education level: Not on file   Occupational History     Not on file   Social Needs     Financial resource strain: Not on file     Food insecurity:     Worry: Not on file     Inability: Not on file     Transportation needs:     Medical: Not on file     Non-medical: Not on file   Tobacco Use     Smoking status: Former Smoker     Last attempt to quit: 1977     Years since quittin.5     Smokeless tobacco: Never Used   Substance and Sexual Activity     Alcohol use: No     Drug use: No     Sexual activity: Not on file   Lifestyle     Physical activity:     Days per week: Not on file     Minutes per session: Not on file     Stress: Not on file   Relationships     Social connections:     Talks on phone: Not on file     Gets together: Not on file     Attends Orthodox service: Not on file     Active member of club or organization: Not on file     Attends meetings of clubs or organizations: Not on file     Relationship status: Not on file     Intimate partner violence:     Fear of current or ex partner: Not on file     Emotionally abused: Not on file     Physically abused: Not on file     Forced sexual activity: Not on file   Other Topics Concern     Not on file   Social History Narrative     Not on file      Current Outpatient Medications   Medication Sig Dispense Refill     albuterol (PROVENTIL) 2.5 mg /3 mL (0.083 %) nebulizer solution Take 2.5 mg by nebulization every 6 (six) hours as needed.   2     ALPRAZolam (XANAX XR) 1 MG 24 hr tablet Take 1 tablet (1 mg total) by mouth 2 (two) times a day. NOON and BEDTIME 60 tablet 0     ALPRAZolam (XANAX) 0.5 MG tablet Take 1 tablet (0.5 mg total) by mouth daily as needed for anxiety. 15 tablet 0     amLODIPine (NORVASC) 5 MG tablet Take 1 tablet (5 mg total) by mouth at bedtime. 90 tablet 3     aspirin 81  "MG EC tablet Take 81 mg by mouth daily.       atenolol (TENORMIN) 25 MG tablet Take 1 tablet (25 mg total) by mouth daily. 90 tablet 3     cholecalciferol, vitamin D3, (VITAMIN D3) 2,000 unit cap Take 2,000 Units by mouth daily.       fluticasone (FLONASE) 50 mcg/actuation nasal spray 1 spray into each nostril as needed for rhinitis.       hydroCHLOROthiazide (HYDRODIURIL) 12.5 MG tablet Take 12.5 mg by mouth daily.       losartan (COZAAR) 100 MG tablet Take 100 mg by mouth daily.       MICROCHAMBER Spcr FOR HOME USE WITH INHALER  0     multivitamin capsule Take 1 capsule by mouth daily.       pantoprazole (PROTONIX) 40 MG tablet Take 40 mg by mouth daily as needed.        potassium chloride (K-DUR,KLOR-CON) 20 MEQ tablet Take 1 tablet (20 mEq total) by mouth daily. 30 tablet 3     rosuvastatin (CRESTOR) 5 MG tablet TAKE ONE TABLET BY MOUTH ONCE DAILY AT BEDTIME 90 tablet 1     tiotropium (SPIRIVA) 18 mcg inhalation capsule Place 1 capsule (2 puffs total) into inhaler and inhale daily. 90 capsule 3     VENTOLIN HFA 90 mcg/actuation inhaler Inhale 2 puffs every 4 (four) hours as needed.   0     vitamin E 400 UNIT capsule Take 400 Units by mouth daily.       No current facility-administered medications for this visit.       Objective:   Vital Signs:   Visit Vitals  /70   Pulse 76   Temp 97.5  F (36.4  C) (Oral)   Resp 16   Ht 4' 10.27\" (1.48 m)   Wt 103 lb 3 oz (46.8 kg)   SpO2 99%   Breastfeeding No   BMI 21.37 kg/m         VisionScreening:  No exam data present     PHYSICAL EXAM  Objective:  Vital signs reviewed and stable  General: No acute distress  Psych: Appropriate affect  HEENT: moist mucous membranes, pupils equal, round, reactive to light and accomodation, posterior oropharynx clear of erythema or exudate, tympanic membranes are pearly grey bilaterally  Lymph: no cervical or supraclavicular lymphadenopathy  Cardiovascular: regular rate and rhythm with no murmur  Pulmonary: clear to auscultation " bilaterally with no wheeze  Abdomen: soft, non tender, non distended with normo-active bowel sounds  Extremities: warm and well perfused with no edema  Skin: warm and dry with no rash      Assessment Results 1/14/2020   Activities of Daily Living No help needed   Instrumental Activities of Daily Living No help needed   Mini Cog Total Score 2   Some recent data might be hidden     A Mini-Cog score of 0-2 suggests the possibility of dementia, score of 3-5 suggests no dementia    Identified Health Risks:     She is at risk for lack of exercise and has been provided with information to increase physical activity for the benefit of her well-being.  Information regarding advance directives (living ng), including where she can download the appropriate form, was provided to the patient via the AVS.       The patient was provided with appropriate referrals to address her memory problem.

## 2021-06-05 NOTE — TELEPHONE ENCOUNTER
Question following Office Visit  When did you see your provider:   01/20/20    What is your question: Patient states she requested medications at [facility] . She will need clearance for these medications per insurance . Please call Customer Service Number as listed below and send New Rx's to Pharmacy .  Thank You     1. rosuvastatin (CRESTOR) 5 MG tablet    2. hydroCHLOROthiazide (HYDRODIURIL) 12.5 MG tablet    3. amLODIPine (NORVASC) 5 MG tablet    4. atenolol (TENORMIN) 25 MG tablet       Okay to leave a detailed message: Yes    customer service number    [facility]     [phone]

## 2021-06-05 NOTE — TELEPHONE ENCOUNTER
----- Message from Rain Martinez MD sent at 1/15/2020  7:59 AM CST -----  Chest xray looks good overall - I will mail the results as well but just wanted to make sure you knew that htings look good.    BB

## 2021-06-05 NOTE — TELEPHONE ENCOUNTER
Dr. Martinez-  See request below.  Last filled on 1/20/20, for a quantity of 15 tablets.  Pt requesting increased quantity.

## 2021-06-05 NOTE — TELEPHONE ENCOUNTER
Medication Question or Clarification  Who is calling: Arabella  What medication are you calling about (include dose and sig)?: Alprazolam 0.5 mg, one tablet daily as needed for anxiety  Who prescribed the medication?: Rain Martinez MD   What is your question/concern?: She would like Dr. Bolanos to give her a higher quantity.  She does have a history of Seasonal Affective Disorder.    Requested Pharmacy: [facility]  Okay to leave a detailed message?: Yes

## 2021-06-06 NOTE — TELEPHONE ENCOUNTER
Medication Request  Medication name: losartan (COZAAR) 100 MG tablet   Requested Pharmacy: [facility]  Reason for request: patient requesting a refill, medication is listed as historical on her medication list  When did you use medication last?:  today  Patient offered appointment:  patient declined  Okay to leave a detailed message: yes

## 2021-06-06 NOTE — TELEPHONE ENCOUNTER
I sent the inhaler rather than the capsules - may be cheaper.  Otherwise she can contact insurance    BB

## 2021-06-06 NOTE — TELEPHONE ENCOUNTER
Controlled Substance Refill Request  Medication Name:   Requested Prescriptions     Pending Prescriptions Disp Refills     ALPRAZolam (XANAX XR) 1 MG 24 hr tablet [Pharmacy Med Name: ALPRAZolam ER 1 MG Oral Tablet Extended Release 24 Hour] 60 tablet 0     Sig: TAKE 1 TABLET BY MOUTH TWICE DAILY AT  NOON  AND  AT  BEDTIME.     Date Last Fill: 01/20/20  Requested [facility]acy: [facility]  Submit electronically to pharmacy  Controlled Substance Agreement on file:   Encounter-Level CSA Scan Date:    There are no encounter-level csa scan date.        Last office visit:  1/14/20 physical

## 2021-06-06 NOTE — TELEPHONE ENCOUNTER
Controlled Substance Refill Request  Medication Name:   Requested Prescriptions     Pending Prescriptions Disp Refills     ALPRAZolam (XANAX) 0.5 MG tablet [Pharmacy Med Name: ALPRAZolam 0.5 MG Oral Tablet] 30 tablet 0     Sig: TAKE 1 TABLET BY MOUTH ONCE DAILY AS NEEDED FOR ANXIETY     Date Last Fill: 1/31/2020  Requested [facility]acy: [facility]  Submit electronically to pharmacy  Controlled Substance Agreement on file:   Encounter-Level CSA Scan Date:    There are no encounter-level csa scan date.        Last office visit:  1/14/2020

## 2021-06-06 NOTE — TELEPHONE ENCOUNTER
Attempted to call pt, to notify Dr. Martinez is out of the office this week, unable to leave a message as voicemail is full.  If pt should call back, please let her know Dr. Martinez is out of the office until Monday, 3/16. Does she have an inhaler to use until she returns?

## 2021-06-06 NOTE — TELEPHONE ENCOUNTER
Covering-  See refill requested below.  Last OV was a physical on 1/14/20.  Rx queued for approval.

## 2021-06-07 NOTE — TELEPHONE ENCOUNTER
Controlled Substance Refill Request  Medication Name:   Requested Prescriptions     Pending Prescriptions Disp Refills     ALPRAZolam (XANAX XR) 1 MG 24 hr tablet [Pharmacy Med Name: ALPRAZolam ER 1 MG Oral Tablet Extended Release 24 Hour] 60 tablet 0     Sig: TAKE 1 TABLET BY MOUTH TWICE DAILY AT  NOON  AND  AT  BEDTIME     Date Last Fill: 2/19/20  Requested Pharmacy: [facility]  Submit electronically to pharmacy  Controlled Substance Agreement on file:   Encounter-Level CSA Scan Date:    There are no encounter-level csa scan date.        Last office visit:  1/14/20

## 2021-06-07 NOTE — TELEPHONE ENCOUNTER
Controlled Substance Refill Request  Medication Name:   Requested Prescriptions     Pending Prescriptions Disp Refills     ALPRAZolam (XANAX) 0.5 MG tablet [Pharmacy Med Name: ALPRAZolam 0.5 MG Oral Tablet] 30 tablet 0     Sig: TAKE 1 TABLET BY MOUTH ONCE DAILY AS NEEDED FOR ANXIETY     Date Last Fill: 3/16/20  Requested Pharmacy: [facility]  Submit electronically to pharmacy  Controlled Substance Agreement on file:   Encounter-Level CSA Scan Date:    There are no encounter-level csa scan date.        Last office visit:  1/14/20

## 2021-06-07 NOTE — TELEPHONE ENCOUNTER
84 year old female patient hx of emphysema and COPDcomplains cough and chest congestion, without fever of 97.7 today, without runny nose, headache, body aches, sore throat and fever for the last 4 days.  Patient is requesting her Zithromax and Medrol dose pack.  Patient states she walking around her house without difficulty.  Patient states she gets a cold every year and this is what she is treated with.  Patient states she does not use oxygen.  Patient has not tried her Albuterol Inhaler because it makes her sick.  Patient will notify her primary if not improving in 24 hours or symptoms increase.  If increased difficulty breathing shortness of breath or chest tightness will go to emergency room.  Recommended to hold the Zithromax for 48 hours to see if she does not need it.    Called into pharmacy Medrol dose and Z pack since not able to send.      Pt is calling in with cough and chest congestion. Pt gets Bronchitis every year, and thinks this is what she may have. Pt denies shortness of breath, but does have a history of emphysema. Pt denies runny nose, headache, body aches, sore throat, and fever.   Pt has not traveled internationally, or to a high risk area, and has not been exposed to anyone who is known to be positive for the Coronavirus.   Home care measures discussed, use of humidifier, increase fluids, and warm fluids like Chicken broth, and per protocol pt should be evaluated by a physician within 24 hours. Chart was sent to the [facility] pool to have a telephone visit scheduled. Pt agrees with plan, and will call back if she develops difficulty breathing, or if symptoms worsen overnight.     Please call pt at [phone] to schedule telephone visit in the morning.      Kenton Chandler RN [facility] Triage/Medication Refill    Reason for Disposition    [1] Known COPD or other severe lung disease (i.e., bronchiectasis, cystic fibrosis, lung surgery) AND [2] worsening symptoms (i.e., increased sputum  purulence or amount, increased breathing difficulty    Protocols used: COUGH - ACUTE [id number]-X-[id number]

## 2021-06-07 NOTE — TELEPHONE ENCOUNTER
Can this wait for Dr. Martinez?  It looks like pt also takes a long-acting version twice daily and was given that at the end of March.

## 2021-06-07 NOTE — TELEPHONE ENCOUNTER
Who is calling:  Patient  Reason for Call:    Patient is questioning status of medication. Patient is out of medication and would like to  today.  Thank you.  Date of last appointment with primary care: 1/14/2020  Okay to leave a detailed message: Yes

## 2021-06-07 NOTE — TELEPHONE ENCOUNTER
Controlled Substance Refill Request  Medication Name:   Requested Prescriptions     Pending Prescriptions Disp Refills     ALPRAZolam (XANAX XR) 1 MG 24 hr tablet [Pharmacy Med Name: ALPRAZolam ER 1 MG Oral Tablet Extended Release 24 Hour] 60 tablet 0     Sig: TAKE 1 TABLET BY MOUTH TWICE DAILY AT  NOON  AND  AT  BEDTIME     Date Last Fill: 3/24/20  Requested Pharmacy: [facility]  Submit electronically to pharmacy  Controlled Substance Agreement on file:   Encounter-Level CSA Scan Date:    There are no encounter-level csa scan date.        Last office visit:  1/14/20

## 2021-06-07 NOTE — TELEPHONE ENCOUNTER
Attempted to call pt, no answer, voicemail is full, unable to leave a message.   If pt should call back, please let her know refill has been sent to the pharmacy.

## 2021-06-08 NOTE — TELEPHONE ENCOUNTER
FYI - Status Update  Who is Calling: Patient  Update: Forgot to tell provider that in the am side pains it hurts, not gas or stomach ache not bowel. Very sore hurts really bad bottom of stomach.  Pt also states she was weighed in at 117 lbs, but actually weighs 109.  Okay to leave a detailed message?:  No

## 2021-06-08 NOTE — TELEPHONE ENCOUNTER
Will mail labs - Kidneys, electrolytes, blood sugar and liver function are normal.  Blood levels are normal as it thyroid.    Can try tylenol every morning 1g for the pain.  Restart protonix as we discussed in clinic    BB

## 2021-06-08 NOTE — TELEPHONE ENCOUNTER
Medication Request  Medication name: Albuterol inhaler  Requested Pharmacy: [facility] in [place]  Reason for request:  History of COPD/Emphysema.  She gets dyspnea at times.  Patient uses Spiriva daily.  When did you use medication last?:  today  Patient offered appointment:  patient declined  Okay to leave a detailed message: yes

## 2021-06-08 NOTE — PROGRESS NOTES
Assessment/Plan:    Arabella Suero is a 84 y.o. female presenting for:    1. Generalized muscle weakness  Etiology is unclear.  Potentially due to some residual effect from her lorazepam.  Recommended trying to eat something before bed to see if this helps as well.  Labs below will be drawn.  - Comprehensive Metabolic Panel  - HM2(CBC w/o Differential)  - Thyroid Cascade    2. Left sided abdominal pain  Etiology is unclear and somewhat vague.  This certainly seems related to eating per her report and I encouraged her to start taking her Protonix.    3. Anxiety, generalized  She has been on many daily medications in the past.  Currently on lorazepam and finds that this works well.  This would not necessarily be my first choice but this is what she was on when she was transferred to me as a patient and she has been stable.  Recommended that I would not increase dosages.    4. Primary osteoarthritis of both hands  She will be seeing the orthopedist in the next month.    5. Essential hypertension  Well-controlled.  Continue medications        Medications Discontinued During This Encounter   Medication Reason     fluticasone (FLONASE) 50 mcg/actuation nasal spray            Chief Complaint:  Chief Complaint   Patient presents with     Back Pain     L upper back pain- gets bad when she eats, takes her pills- has had in the past but was told it was muscluar        Subjective:   Arabella Suero is a pleasant 84-year-old female with a past medical history significant for anxiety, hypertension, osteoarthritis and hyperlipidemia presenting to the clinic today to discuss several concerns:    1.  Weakness: Patient notes that when she wakes up in the morning she generally feels weak.  She states that this tends to improve after she eats something although she is been having some left-sided pain while eating so she sometimes puts that off for a bit.  She has not fallen.  She does not feel dizzy or lightheaded.    2.  Left-sided pain:  Patient states that when she eats she will experience left-sided pain which is on the left side of her abdomen and in her left flank area.  She really does relate this to eating and does not feel as though it is muscular although this is what she had been told in the past.  She states that she is not feeling nauseated.  She does have Protonix which she has not been taking but is unsure why she stopped taking.  She thinks that this medication may be does help sometimes.    3.  Anxiety: Patient has been on many daily anxiety medications in the past and is currently taking Xanax extended release 1 mg twice daily as well as a as needed short acting as needed once daily.  She was on these medications when she transferred to me as a patient and had been stable on them for many years.  She is aware of the risks of falling.    4.  Hypertension: Patient is on amlodipine, atenolol and hydrochlorothiazide.  She is doing well with these medications.  No chest pain or shortness of breath.    12 point review of systems completed and negative except for what has been described above    Social History     Tobacco Use   Smoking Status Former Smoker     Last attempt to quit: 1977     Years since quittin.8   Smokeless Tobacco Never Used       Current Outpatient Medications   Medication Sig     albuterol (PROVENTIL) 2.5 mg /3 mL (0.083 %) nebulizer solution Take 2.5 mg by nebulization every 6 (six) hours as needed.      ALPRAZolam (XANAX XR) 1 MG 24 hr tablet TAKE 1 TABLET BY MOUTH TWICE DAILY AT  NOON  AND  AT  BEDTIME     ALPRAZolam (XANAX) 0.5 MG tablet TAKE 1 TABLET BY MOUTH ONCE DAILY AS NEEDED FOR ANXIETY     amLODIPine (NORVASC) 5 MG tablet Take 1 tablet (5 mg total) by mouth at bedtime.     aspirin 81 MG EC tablet Take 81 mg by mouth daily.     atenoloL (TENORMIN) 25 MG tablet Take 1 tablet (25 mg total) by mouth daily.     cholecalciferol, vitamin D3, (VITAMIN D3) 2,000 unit cap Take 2,000 Units by mouth daily.      "hydroCHLOROthiazide (HYDRODIURIL) 12.5 MG tablet Take 1 tablet (12.5 mg total) by mouth daily.     losartan (COZAAR) 100 MG tablet Take 1 tablet (100 mg total) by mouth daily.     MICROCHAMBER Spcr FOR HOME USE WITH INHALER     multivitamin capsule Take 1 capsule by mouth daily.     pantoprazole (PROTONIX) 40 MG tablet Take 40 mg by mouth daily as needed.      potassium chloride (K-DUR,KLOR-CON) 20 MEQ tablet Take 1 tablet (20 mEq total) by mouth daily.     rosuvastatin (CRESTOR) 5 MG tablet Take 1 tablet (5 mg total) by mouth at bedtime.     tiotropium bromide (SPIRIVA RESPIMAT) 2.5 mcg/actuation Mist Inhale 2 puffs daily.     VENTOLIN HFA 90 mcg/actuation inhaler Inhale 2 puffs every 4 (four) hours as needed.     vitamin E 400 UNIT capsule Take 400 Units by mouth daily.         Objective:  Vitals:    05/21/20 1316   BP: 138/72   Pulse: 67   Weight: 112 lb (50.8 kg)   Height: 4' 10.27\" (1.48 m)       Body mass index is 23.19 kg/m .    Vital signs reviewed and stable  General: No acute distress  Psych: Appropriate affect  HEENT: moist mucous membranes  Lymph: no cervical or supraclavicular lymphadenopathy  Cardiovascular: regular rate and rhythm with no murmur  Pulmonary: clear to auscultation bilaterally with no wheeze  Abdomen: soft, non tender, non distended with normo-active bowel sounds, no pain with palpation.  Extremities: warm and well perfused with no edema  Skin: warm and dry with no rash         This note has been dictated and transcribed using voice recognition software.   Any errors in transcription are unintentional and inherent to the software.  "

## 2021-06-09 NOTE — PROGRESS NOTES
"Arabella Suero is a 84 y.o. female who is being evaluated via a billable telephone visit.      The patient has been notified of following:     \"This telephone visit will be conducted via a call between you and your physician/provider. We have found that certain health care needs can be provided without the need for a physical exam.  This service lets us provide the care you need with a short phone conversation.  If a prescription is necessary we can send it directly to your pharmacy.  If lab work is needed we can place an order for that and you can then stop by our lab to have the test done at a later time.    Telephone visits are billed at different rates depending on your insurance coverage. During this emergency period, for some insurers they may be billed the same as an in-person visit.  Please reach out to your insurance provider with any questions.    If during the course of the call the physician/provider feels a telephone visit is not appropriate, you will not be charged for this service.\"    Patient has given verbal consent to a Telephone visit? Yes    What phone number would you like to be contacted at? [phone]    Patient would like to receive their AVS by AVS Preference: Mail a copy.    Phone call duration: 30 minutes    Tiffany Momin CMA    Pulmonary Clinic Outpatient Consultation    Assessment and Plan:   84 year old female never smoker with a history of extensive occupational second-hand smoke exposure, radiographic emphysema, HTN, dyslipidemia, GERD, left carpal tunnel surgery, stroke, extensive CAD on CT coronary calcium scoring, vitamin D deficiency, osteoarthritis, presenting for evaluation of dyspnea.    Dyspnea: History is fairly tangential, though what I am able to determine is that the patient has dyspnea when talking, but has no concerning exertional dyspnea. 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 She may have had PFTs at [facility] in 2016, but I cannot locate the test results in the record. She recently took fluticasone furoate-umeclidinium-vilanterol for 4 days but developed urinary frequency and constipation, so went back to tiotropium handihaler. She also is high risk for symptomatic CAD based on CT coronary calcium scoring, and I do not see that she has had a TTE done; she does deny orthopnea, PND, and leg swelling. Overall, her symptoms are not especially concerning for advanced cardiopulmonary disease, though without basic testing this is difficult to determine. I recommended that we at least obtain pulmonary function testing and a TTE, and she is in agreement.    Plan:  - PFT at [facility]  - TTE  - patient has discontinued fluticasone furoate-umeclidinium-vilanterol due to side effects as noted above  - continue tiotropium handihaler; inhale the contents of one 18-mcg capsule daily  - continue cetirizine 10 mg daily  - continue nasal fluticasone one spray in each nostril daily  - follow-up depending on above test results    I appreciate the opportunity to participate in the care of Ms. Suero. Please feel free to contact me at any time.    Roberth Edwarsd MD  [facility]  Cell [phone]  Office [phone]  Pager [phone]    CCx: dyspnea, emphysema    HPI: 84 year old female never smoker with a history of extensive occupational second-hand smoke exposure, radiographic emphysema, HTN, dyslipidemia, GERD, left carpal tunnel surgery, stroke, extensive CAD on CT coronary calcium scoring, vitamin D deficiency, osteoarthritis, presenting for evaluation of dyspnea. Tangential history. 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 Has had dyspnea for about 8 months. Previously was not short of breath until the last 8 months.  Has bad summer allergies. Walks a lot but the heat makes it difficult. No chest pain or pressure. No cough. Has a lot of sinus congestion. Has nasal and conjunctival pruritus, has postnasal drip. Says she uses nasal fluticasone. Taking Zyrtec once daily. No leg swelling. Has dyspnea when talking but not with activity. Normal Hgb and TSH. Feels like she cannot get a deep breath if talking. States she sleeps a lot. Urinating a lot and constipated with Trelegy after using 3-4 days. Spiriva was tolerated better so went back to Spiriva.    ROS:  A 12-system review was obtained and was negative with the exception of the symptoms endorsed in the history of present illness.    PMH:  Extensive occupational second-hand smoke exposure  Radiographic emphysema  HTN  Dyslipidemia  GERD  Left carpal tunnel surgery  Stroke  Extensive CAD on CT coronary calcium scoring  Vitamin D deficiency  Osteoarthritis    PSH:  Past Surgical History:   Procedure Laterality Date     ABDOMINAL SURGERY       BREAST SURGERY       carpel tunnel -left Left      HYSTERECTOMY         Allergies:  Allergies   Allergen Reactions     Other Drug Allergy (See Comments)      Antidepressants- drowsiness or hyper       Family HX:  Family History   Problem Relation Age of Onset     Congenital heart disease Father        Social Hx:  Social History     Socioeconomic History     Marital status:      Spouse name: Not on file     Number of children: Not on file     Years of education: Not on file     Highest education level: Not on file   Occupational History     Not on file   Social Needs     Financial resource strain: Not on file     Food insecurity     Worry: Not on file     Inability: Not on file     Transportation needs     Medical: Not on file     Non-medical: Not on file   Tobacco Use     Smoking status: Former Smoker     Last attempt to quit: 1977     Years since quittin.9     Smokeless tobacco: Never Used   Substance and Sexual Activity      Alcohol use: No     Drug use: No     Sexual activity: Not on file   Lifestyle     Physical activity     Days per week: Not on file     Minutes per session: Not on file     Stress: Not on file   Relationships     Social connections     Talks on phone: Not on file     Gets together: Not on file     Attends Taoist service: Not on file     Active member of club or organization: Not on file     Attends meetings of clubs or organizations: Not on file     Relationship status: Not on file     Intimate partner violence     Fear of current or ex partner: Not on file     Emotionally abused: Not on file     Physically abused: Not on file     Forced sexual activity: Not on file   Other Topics Concern     Not on file   Social History Narrative     Not on file       Current Meds:  Current Outpatient Medications   Medication Sig Dispense Refill     albuterol (PROVENTIL) 2.5 mg /3 mL (0.083 %) nebulizer solution Take 2.5 mg by nebulization every 6 (six) hours as needed.   2     ALPRAZolam (XANAX XR) 1 MG 24 hr tablet TAKE 1 TABLET BY MOUTH AT NOON AND AT BEDTIME 60 tablet 0     ALPRAZolam (XANAX) 0.5 MG tablet TAKE 1 TABLET BY MOUTH ONCE DAILY AS NEEDED FOR ANXIETY 30 tablet 0     amLODIPine (NORVASC) 5 MG tablet Take 1 tablet (5 mg total) by mouth at bedtime. 90 tablet 3     aspirin 81 MG EC tablet Take 81 mg by mouth daily.       atenoloL (TENORMIN) 25 MG tablet Take 1 tablet (25 mg total) by mouth daily. 90 tablet 3     cholecalciferol, vitamin D3, (VITAMIN D3) 2,000 unit cap Take 2,000 Units by mouth daily.       hydroCHLOROthiazide (HYDRODIURIL) 12.5 MG tablet Take 1 tablet (12.5 mg total) by mouth daily. 90 tablet 3     losartan (COZAAR) 100 MG tablet Take 1 tablet (100 mg total) by mouth daily. 90 tablet 2     MICROCHAMBER Spcr FOR HOME USE WITH INHALER  0     multivitamin capsule Take 1 capsule by mouth daily.       pantoprazole (PROTONIX) 40 MG tablet Take 40 mg by mouth daily as needed.        potassium chloride  (K-DUR,KLOR-CON) 20 MEQ tablet Take 1 tablet (20 mEq total) by mouth daily. 30 tablet 3     rosuvastatin (CRESTOR) 5 MG tablet Take 1 tablet (5 mg total) by mouth at bedtime. 90 tablet 3     vitamin E 400 UNIT capsule Take 400 Units by mouth daily.       fluticasone-umeclidinium-vilanterol (TRELEGY ELLIPTA) 100-62.5-25 mcg DsDv inhaler Inhale 1 Inhalation daily. 1 each 3     SPIRIVA WITH HANDIHALER 18 mcg inhalation capsule ONE CAPSULE (2 PUFF TOTAL) INTO INHALER AND INHALE DAILY       VENTOLIN HFA 90 mcg/actuation inhaler Inhale 2 puffs every 4 (four) hours as needed. 1 Inhaler 4     No current facility-administered medications for this visit.        Physical Exam:  no exam due to virtual visit    Labs:  reviewed    Imaging studies:  CXR (January 2020):  - images directly reviewed, formal interpretation follows:  IMPRESSION:   The lungs are well-inflated. No acute infiltrate or pleural effusion. Normal heart size. No pulmonary vascular congestion. Calcified granulomas are seen in both pulmonary antwon. Prominent vessels are noted on the left but the contours appear   stable on the lateral view. Mild degenerative change in the spine. Stable mild wedge deformities in the mid thoracic segment. Mild scoliosis.    Chest CTA (December 2019):  - images directly reviewed, formal interpretation follows:  FINDINGS:  ANGIOGRAM CHEST: Pulmonary arteries are normal caliber and negative for pulmonary emboli. Thoracic aorta is negative for dissection. No CT evidence of right heart strain.     LUNGS AND PLEURA: Moderate emphysema. Benign calcified granuloma right upper lobe. Lungs otherwise clear.     MEDIASTINUM/AXILLAE: Normal.     UPPER ABDOMEN: Normal.     MUSCULOSKELETAL: Stable minimal vertebral body height loss from T7 to T11 as seen on plain film. No compression fractures at T5 or T6 as suggested by plain film. Much better seen on CT.     IMPRESSION:   1.  Negative for pulmonary emboli and dissections.     2.  Moderate  emphysema.     3.  No convincing evidence for any acute compression fractures in the thoracic spine.    CT coronary calcium scoring (July 2017):  - The total Agatston calcium score is 460. A calcium score in this range places the individual in the 100th percentile when compared to an age and gender matched control group and implies a very high risk of cardiac events in the next ten years.  - Technically difficult study. Study is limited secondary to significant motion artifact. The midportion of the left anterior descending is not optimally visualized.  - Findings suggest potentially significant stenosis in the early midportion of the right coronary artery with soft plaque and diffuse calcified plaque. Estimated luminal stenosis of 60-70%.  - Diffuse disease suggested in the proximal and mid left anterior descending. Moderate diffuse disease suggested although the LAD is suboptimally visualized in its midsection. Estimated luminal stenosis of 50-60%.

## 2021-06-09 NOTE — TELEPHONE ENCOUNTER
Faxed referral to [facility]- they will review for appropriateness & contact patient to schedule

## 2021-06-09 NOTE — TELEPHONE ENCOUNTER
Patient returning call to [facility].  FNA advised Dr. Martinez made the referral.  FNA attempted to contact clinic but was unable to, phone disconnected from patient.  FNA called back but patient did not .    Reason for Disposition    [1] Follow-up call from patient regarding patient's clinical status AND [2] information NON-URGENT    Additional Information    Negative: ED call to PCP    Negative: Physician call to PCP    Negative: Call about patient who is currently hospitalized    Negative: Lab or radiology calling with CRITICAL test results    Negative: [1] Prescription not at pharmacy AND [2] was prescribed today by PCP    Negative: [1] Follow-up call from patient regarding patient's clinical status AND [2] information urgent    Negative: [1] Caller requests to speak ONLY to PCP AND [2] URGENT question    Negative: [1] Caller requests to speak to PCP now AND [2] won't tell us reason for call  (Exception: if 10 pm to 6 am, caller must first discuss reason for the call)    Negative: Notification of hospital admission    Negative: Notification of death    Negative: Caller requesting lab results    Negative: Lab or radiology calling with test results    Protocols used: PCP CALL - NO TRIAGE-A-

## 2021-06-09 NOTE — TELEPHONE ENCOUNTER
Who is calling:  Arabella  Reason for Call:  Patient is calling back.  She is asking if she can't get into a lung doctor at [place] within a few weeks, then who would Dr. Martinez advise her to go to go see locally.    Date of last appointment with primary care:  5/21/20  Okay to leave a detailed message: Yes

## 2021-06-09 NOTE — TELEPHONE ENCOUNTER
Dr. Martinez-  I spoke to pt she states she has been on Spiriva but has noticed increase difficulty with her breathing. Has a harder time coughing up the phlegm in the morning. She asked the pharmacist if there was any changes he thought that could be made to her inhaler. The pharmacist suggested Trelegy. She states she was put on the spiriva years ago due to her second hand smoke exposure, but states over the years she has now developed emphysema and would like to try Trelegy instead. Pharmacist told her trelegy has 3 medications in it, instead of just the one in spiriva. Pharmacist thought this may be beneficial for her. Is this something she could try?

## 2021-06-09 NOTE — TELEPHONE ENCOUNTER
Controlled Substance Refill Request  Medication Name:   Requested Prescriptions     Pending Prescriptions Disp Refills     ALPRAZolam (XANAX) 0.5 MG tablet [Pharmacy Med Name: ALPRAZolam 0.5 MG Oral Tablet] 30 tablet 0     Sig: TAKE 1 TABLET BY MOUTH ONCE DAILY AS NEEDED FOR ANXIETY     ALPRAZolam (XANAX XR) 1 MG 24 hr tablet [Pharmacy Med Name: ALPRAZolam ER 1 MG Oral Tablet Extended Release 24 Hour] 60 tablet 0     Sig: TAKE 1 TABLET BY MOUTH AT NOON AND AT BEDTIME     Date Last Fill: 5/29/20  Requested Pharmacy: [facility]  Submit electronically to pharmacy  Controlled Substance Agreement on file:   Encounter-Level CSA Scan Date:    There are no encounter-level csa scan date.        Last office visit:  5/21/20

## 2021-06-09 NOTE — TELEPHONE ENCOUNTER
Patient Returning Call  Reason for call:  Patient is returning call  Information relayed to patient:  I would like Siva Martinez nurse to return my call.  No other information was offered just she knows me well and I would like her to return my call.   Patient has additional questions:  No  If YES, what are your questions/concerns:  none  Okay to leave a detailed message?: Yes

## 2021-06-09 NOTE — TELEPHONE ENCOUNTER
Spoke to pt, relayed that Dr. Martinez did enter referral to [facility] for her.  She would like help scheduling this appt.    Alison-  Can you help? Toni is out on [facility].  Arabella can be reached at [phone].

## 2021-06-09 NOTE — TELEPHONE ENCOUNTER
----- Message -----  From: Michael Ospina MD  Sent: 7/13/2020   4:05 PM CDT  To: Danay Merino RN    Please let the patient know that I have reviewed her pulmonary Dr. Edwards's note.  It is reasonable to have regular echo and pulmonary function test for evaluation of dyspnea.  I am looking forward to review the report of ECHO.  Thanks  Tan  ----------------------------------------------------------------------------------------------------------    Recommendations relayed to pt.  Pt verbalized understanding and had no questions.  -grace

## 2021-06-09 NOTE — TELEPHONE ENCOUNTER
"Dr. Martinez-  Spoke to pt she would like a referral to go back to [place] to see a lung specialist.  She states she was seen there years ago and would like to return but needs a referral.  She prefers to go back to [place] as she had a good experience there.  She feels like her breathing has slowly gotten worse over time, more short of breath. Overall says, \"I just don't feel good and I would like to see what they have to say for other alternatives I could try.\"  "

## 2021-06-09 NOTE — TELEPHONE ENCOUNTER
Pulmonary Telephone Note    TTE unremarkable. E/e' 8-15, equivocal for LV filling pressures. Has not scheduled pulmonary function testing at the [facility] yet. Once I received those results, will call her with results.    Roberth Edwards MD  Pulmonary and Critical Care Medicine  [facility]  Cell [phone]  Office [phone]  Pager [phone]

## 2021-06-09 NOTE — TELEPHONE ENCOUNTER
----- Message from Milena Ortiz sent at 7/10/2020  3:00 PM CDT -----  General phone call:    Caller: Patient  Primary cardiologist: Dr. Ospina    Detailed reason for call: Patient wanted to let Dr. Ospina know that she is having a test on her heart and lungs at [facility] on 7/23/2020 she said if Dr. Ospina wanted to talk to the doctor (who she doesn't know the name) their number is [phone]    Best phone number: [phone]  Best time to contact: today  Ok to leave a detailed message? yes  Device? No    Additional Info:        Dr Anai Bell is having echo at [facility] on 7/23/20, ordered by Dr Edwards and she was kind enough to get his phone # for you if you need it and want to speak to him.  -grace

## 2021-06-09 NOTE — TELEPHONE ENCOUNTER
Medication Request  Medication name: Trelegy inhaler   Requested Pharmacy: [facility]  Reason for request: COPD, caller is wondering if this is okay for her to start taking.   When did you use medication last?:    Patient offered appointment:  patient declined  Okay to leave a detailed message: yes

## 2021-06-09 NOTE — TELEPHONE ENCOUNTER
Are you able to help her with the [facility] referral??    I would recommend [facility] pulm if she can not get in    BB

## 2021-06-10 NOTE — TELEPHONE ENCOUNTER
8/19/2020 11:30 AM (20 min)   Shannan    Arrive by: 11:15 AM    OFFICE VISIT    [facility] [[facility]]    Michael Ospina MD

## 2021-06-10 NOTE — TELEPHONE ENCOUNTER
Patient would need to have an appointment prior to these prescriptions being given.  Probably okay to do a virtual visit.  Unfortunately do not have any openings at this time.  She could reach out to her pulmonologist?    BB

## 2021-06-10 NOTE — TELEPHONE ENCOUNTER
Patient calling.  She is reporting she has a history of COPD, from second hand smoke.    She reports , she gets really bad allergies every season at this time  She is asking for ;      Methyl prednisone/ and antibiotic.  Reports , Dr. Martinez has given this to me before.    Symptoms;  A lot of drainage and phlegm just running down throat.  Also tangential in conversation.Hard to follow.  She is asking for a metro pack and also would like an antibiotic.    Call was transferred to [facility] at [place] to help patient get medication she is asking for.    Patient reports she is not interested in going to the hospital.  Please advise.    Darcie Diaz RN  [facility] Triage/refill nurse      Additional Information    Negative: SEVERE difficulty breathing (e.g., struggling for each breath, speaks in single words, pulse > 120)    Negative: Bluish (or gray) lips or face now    Negative: Difficult to awaken or acting confused (e.g., disoriented, slurred speech)    Negative: Sounds like a life-threatening emergency to the triager    Negative: [1] SEVERE weakness (e.g., can't stand or can barely walk) AND [2] new onset or worse than when discharged from hospital    Negative: [1] MODERATE difficulty breathing (e.g., speaks in phrases, SOB even at rest, pulse 100-120) AND [2] worse than when discharged from hospital    Negative: Patient sounds very sick or weak to the triager    Negative: Chest pain    Negative: Fever > 100.5 F (38.1 C)    Protocols used: COPD POST-HOSPITALIZATION FOLLOW-UP CALL-A-

## 2021-06-10 NOTE — TELEPHONE ENCOUNTER
Pulmonary Telephone Note    TTE unremarkable. E/e' 8-15, equivocal for LV filling pressures. Has not scheduled pulmonary function testing yet, but also her phone number in the chart was incorrect. Called her  and he had me speak with the patient and I updated her phone number. Will have the lung clinic call her to schedule PFTs and I will contact her when I have results. She is in agreement.    Roberth Edwards MD  Pulmonary and Critical Care Medicine  [facility]  Cell [phone]  Office [phone]  Pager [phone]

## 2021-06-10 NOTE — TELEPHONE ENCOUNTER
Controlled Substance Refill Request  Medication Name:   Requested Prescriptions     Pending Prescriptions Disp Refills     ALPRAZolam (XANAX) 0.5 MG tablet [Pharmacy Med Name: ALPRAZolam 0.5 MG Oral Tablet] 30 tablet 0     Sig: TAKE 1 TABLET BY MOUTH ONCE DAILY AS NEEDED FOR ANXIETY     Date Last Fill: 6/29/20  Requested Pharmacy: [facility]  Submit electronically to pharmacy  Controlled Substance Agreement on file:   Encounter-Level CSA Scan Date:    There are no encounter-level csa scan date.        Last office visit:  5/21/20

## 2021-06-10 NOTE — TELEPHONE ENCOUNTER
Patient Returning Call  Reason for call:  Patient calling back  Information relayed to patient:  Relayed below message from the provider  Patient has additional questions:  No  If YES, what are your questions/concerns:  Transferred patient to scheduling to make an appointment.  Okay to leave a detailed message?: No call back needed

## 2021-06-10 NOTE — TELEPHONE ENCOUNTER
Pulmonary Telephone Note    Arabella called me back on the [facility] ICU office phone from which I called her today. She sounded tangential, confused, unclear who I was despite our recent conversation. Her subsequent statements were difficult to interpret, and I suspect she has underlying dementia. She states that she does not want to go to the [facility] or [facility] for testing, and that she will only go to [place], then says that she does not want to drive to [place]. I attempted to redirect her, ask about her dyspnea and discuss a way to get pulmonary function testing done, but did not get anywhere with the conversation. She denies exertional dyspnea, saying it only happens when she talks. At this point, will cancel attempts to schedule pulmonary function testing and have her call us if she would like to proceed.    Roberth Edwards MD  Pulmonary and Critical Care Medicine  [facility]  Cell [phone]  Office [phone]  Pager [phone]

## 2021-06-10 NOTE — PROGRESS NOTES
General surgery consult         ASSESSMENT       This is an elderly patient with aortic ectasia.  The aorta is 2.3 cm in diameter and has some intraluminal thrombus.  At this diameter this does not warrant surgical intervention.  The intraluminal thrombus does put her at some risk for distal embolic events.  To my evaluation it is not evident that the patient's had any embolic events thus far.  I would recommend monitoring this patient's aorta with annual ultrasound evaluation.  If the aorta gets greater than 4 cm in diameter then we should consider a procedure to prevent rupture of a developing aortic aneurysm.           PLAN        I recommend monitoring this patient's aorta with annual ultrasound evaluation.  If the aorta gets greater than 4 cm in diameter then we should consider a procedure to prevent rupture of a developing aortic aneurysm.       CHIEF COMPLAINT   No chief complaint on file.        CARIDAD Suero is a 81 y.o. female who presents with abdominal pain and diarrhea.  She had a CT scan which showed an ectatic aorta with some intraluminal thrombus.  The aorta measures 2.3 cm in diameter according to her CT scan report.  Patient does not give any history of embolic events distal to the infrarenal aorta.  Vascular surgery was consulted to evaluate this atypical appearance of this patient's aorta.           PAST MEDICAL HISTORY SURGICAL HISTORY     Past Medical History:   Diagnosis Date     GERD (gastroesophageal reflux disease)      Hyperlipidemia      Hypertension     Past Surgical History:   Procedure Laterality Date     ABDOMINAL SURGERY       BREAST SURGERY       carpel tunnel -left Left      HYSTERECTOMY            CURRENT MEDICATIONS ALLERGIES     [unfilled] Allergies   Allergen Reactions     Other Drug Allergy (See Comments)      Antidepressants- drowsiness or hyper          FAMILY HISTORY     Family History   Problem Relation Age of Onset     Congenital heart disease Father          SOCIAL  HISTORY     Social History     Social History     Marital status:      Spouse name: N/A     Number of children: N/A     Years of education: N/A     Social History Main Topics     Smoking status: Former Smoker     Smokeless tobacco: Not on file     Alcohol use No     Drug use: No     Sexual activity: Not on file     Other Topics Concern     Not on file     Social History Narrative         REVIEW OF SYSTEMS       12 point review of systems are unremarkable except for the symptoms described in the HPI    PHYSICAL EXAM     VITAL SIGNS: There were no vitals taken for this visit.   General : Alert, cooperative, appears younger than stated age   Skin: Skin color, texture, turgor normal, no rashes or lesions   Lymph nodes: No obvious adenopathy   Head: Normocephalic, without obvious abnormality, atraumatic   HEENT: PERRL, conjunctiva/corneas clear, EOM's intact, no scleral icterus   Throat: Lips, mucosa, and tongue normal; teeth and gums normal    Neck: Supple, thyroid not enlarged   Back: No CVA tenderness   Lungs: Clear to auscultation bilaterally, respirations unlabored, no rales, rhonchi or wheezes   Chest Wall:No tenderness or deformity   Heart: Regular rate and rhythm, S1, S2 normal, no murmur, rub or gallop   Abdomen: Soft, non-tender, no guarding, + bowel sounds active,   Extremities : No obvious swelling, palpable pulses in both groins.  She has palpable pulses in both feet.  She is ambulatory with no obvious weakness in her lower extremities.  She does have some deformities of her hands, she has a ganglion cyst on the left wrist.  Neurologic: Cranial Nerves II-XII normal, moves all extremities equally, no focal findings          RADIOLOGY       CT scan show some diverticular disease shows an ectatic infrarenal aorta with some intraluminal thrombus.    EKG     Reviewed        LABS   No results found for this visit on 04/19/17.        [facility]  [phone]

## 2021-06-10 NOTE — TELEPHONE ENCOUNTER
Patient is requesting a Metro Pack. Last had this medication 10/17/2019. Please advise and call patient asap.    Symptom  Describe your symptoms: clear phlegm moderate @ night, little during the day, occasional cough to clear phlegm, denies fever, feels worse when humidity is high, denies SOB  Any pain: no  New/Ongoing: New  How long have you been having symptoms: 1  week(s)  Have you been seen for this:  No  Appointment offered?: Patient declines  Triage offered?: Yes, declined  Home remedies tried: takes Claritin, Mucinex nasal spray and increases her water intake  Requested Pharmacy: [facility]  Okay to leave a detailed message? Yes

## 2021-06-10 NOTE — TELEPHONE ENCOUNTER
Controlled Substance Refill Request  Medication Name:   Requested Prescriptions     Pending Prescriptions Disp Refills     ALPRAZolam (XANAX XR) 1 MG 24 hr tablet [Pharmacy Med Name: ALPRAZolam ER 1 MG Oral Tablet Extended Release 24 Hour] 60 tablet 0     Sig: TAKE 1 TABLET BY MOUTH AT NOON AND AT BEDTIME     Date Last Fill: 6/29/20  Requested Pharmacy: [facility]  Submit electronically to pharmacy  Controlled Substance Agreement on file:   Encounter-Level CSA Scan Date:    There are no encounter-level csa scan date.        Last office visit:  5/21/20  Jyotsna Melgar RN, MA  [facility]    Triage Nurse Advisor

## 2021-06-11 NOTE — TELEPHONE ENCOUNTER
Controlled Substance Refill Request  Medication Name:   Requested Prescriptions     Pending Prescriptions Disp Refills     ALPRAZolam (XANAX) 0.5 MG tablet 30 tablet 0     Date Last Fill: 07/30/20  Requested Pharmacy: [facility]  Submit electronically to pharmacy  Controlled Substance Agreement on file:   Encounter-Level CSA Scan Date:    There are no encounter-level csa scan date.        Last office visit:

## 2021-06-11 NOTE — TELEPHONE ENCOUNTER
RN cannot approve Refill Request    RN can NOT refill this medication med is not covered by policy/route to provider. Last office visit: 5/21/2020 Rain Martinez MD Last Physical: 1/14/2020 Last MTM visit: Visit date not found Last visit same specialty: 5/21/2020 Rain Martinez MD.  Next visit within 3 mo: Visit date not found  Next physical within 3 mo: Visit date not found      Darcie Diaz, [facility] Triage/Med Refill 8/31/2020    Requested Prescriptions   Pending Prescriptions Disp Refills     ALPRAZolam (XANAX XR) 1 MG 24 hr tablet [Pharmacy Med Name: ALPRAZolam ER 1 MG Oral Tablet Extended Release 24 Hour] 60 tablet 0     Sig: TAKE 1 TABLET BY MOUTH AT NOON AND AT BEDTIME       Controlled Substances Refill Protocol Failed - 8/27/2020  9:31 AM        Failed - Route all Controlled Substance Requests to Provider        Failed - Patient has controlled substance agreement in past 12 months     Encounter-Level CSA Scan Date:    There are no encounter-level csa scan date.               Passed - Visit with PCP or prescribing provider visit in past 12 months      Last office visit with prescriber/PCP: 5/21/2020 Rain Martinez MD OR same dept: 12/17/2019 Rain Martinez MD OR same specialty: 5/21/2020 Rain Martinez MD Last physical: 1/14/2020 Last MTM visit: Visit date not found    Next visit within 3 mo: Visit date not found  Next physical within 3 mo: Visit date not found  Prescriber OR PCP: Rain Martinez MD  Last diagnosis associated with med order: 1. Anxiety, generalized  - ALPRAZolam (XANAX XR) 1 MG 24 hr tablet [Pharmacy Med Name: ALPRAZolam ER 1 MG Oral Tablet Extended Release 24 Hour]; TAKE 1 TABLET BY MOUTH AT NOON AND AT BEDTIME  Dispense: 60 tablet; Refill: 0

## 2021-06-11 NOTE — TELEPHONE ENCOUNTER
Controlled Substance Refill Request  Medication Name:   Requested Prescriptions     Pending Prescriptions Disp Refills     ALPRAZolam (XANAX XR) 1 MG 24 hr tablet [Pharmacy Med Name: ALPRAZolam ER 1 MG Oral Tablet Extended Release 24 Hour] 60 tablet 0     Sig: TAKE 1 TABLET BY MOUTH AT NOON AND AT BEDTIME     ALPRAZolam (XANAX) 0.5 MG tablet [Pharmacy Med Name: ALPRAZolam 0.5 MG Oral Tablet] 30 tablet 0     Sig: TAKE 1 TABLET BY MOUTH ONCE DAILY AS NEEDED FOR ANXIETY     Date Last Fill: 09/01/20  Requested Pharmacy: [facility]  Submit electronically to pharmacy  Controlled Substance Agreement on file:   Encounter-Level CSA Scan Date:    There are no encounter-level csa scan date.        Last office visit:  09/01/20

## 2021-06-11 NOTE — PROGRESS NOTES
Assessment/Plan:    Arabella Suero is a 84 y.o. female presenting for:    1. COPD exacerbation (H)  Given that she sounds fairly wheezy today and she is coughing a bit I have sent prednisone as well as azithromycin to the pharmacy.  She has not been having any fevers and this is been going on for about 14 days so I do not think that she is at high risk for being a COVID-19 exposure.  - predniSONE (DELTASONE) 20 MG tablet; Take 20 mg by mouth 2 (two) times a day for 5 days.  Dispense: 10 tablet; Refill: 0  - azithromycin (ZITHROMAX Z-YOVANNY) 250 MG tablet; Take 2 tablets (500 mg) on  Day 1,  followed by 1 tablet (250 mg) once daily on Days 2 through 5.  Dispense: 6 tablet; Refill: 0    2. Anxiety, generalized  Very anxious.  Refill Xanax sent to the pharmacy.  She states that she has tried a multitude of medications in the past and this is worked the best.  - ALPRAZolam (XANAX XR) 1 MG 24 hr tablet; TAKE 1 TABLET BY MOUTH AT NOON AND AT BEDTIME  Dispense: 60 tablet; Refill: 0    Patient is fairly tangential during our discussion today.  We did start the discussion regarding some potential dementia although she denies any issues.  We discussed that we could consider further testing and she declines at this time.    Medications Discontinued During This Encounter   Medication Reason     ALPRAZolam (XANAX XR) 1 MG 24 hr tablet Reorder           Chief Complaint:  Chief Complaint   Patient presents with     Personal Problem       Subjective:   Arabella Suero is a pleasant 84-year-old female with a past medical history significant for fairly severe emphysema who presents to the clinic today with concerns over shortness of breath and coughing.    Patient states that she always feels slightly short of breath but about 3 weeks ago she began to have a cough.  She is not any fevers but has been coughing up some sputum occasionally.    She states that her legs feel tight.  She is been using her albuterol but has not been helping that  much.    Anxiety: She has a past medical history significant for anxiety.  She states that she has tried many daily medications in the past but they have not been helpful.  She is currently taking Xanax which has been working fairly well for her.  She has been on the same dose for several years.    She mentions that her kids feel as though she should not be driving any longer.  She herself does not feel as though she is confused.    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       Current Outpatient Medications   Medication Sig     albuterol (PROVENTIL) 2.5 mg /3 mL (0.083 %) nebulizer solution Take 2.5 mg by nebulization every 6 (six) hours as needed.      ALPRAZolam (XANAX XR) 1 MG 24 hr tablet TAKE 1 TABLET BY MOUTH AT NOON AND AT BEDTIME     ALPRAZolam (XANAX) 0.5 MG tablet TAKE 1 TABLET BY MOUTH ONCE DAILY AS NEEDED FOR ANXIETY     amLODIPine (NORVASC) 5 MG tablet Take 1 tablet (5 mg total) by mouth at bedtime.     aspirin 81 MG EC tablet Take 81 mg by mouth daily.     atenoloL (TENORMIN) 25 MG tablet Take 1 tablet (25 mg total) by mouth daily.     cetirizine (ZYRTEC) 10 MG tablet Take 1 tablet (10 mg total) by mouth daily.     cholecalciferol, vitamin D3, (VITAMIN D3) 2,000 unit cap Take 2,000 Units by mouth daily.     fluticasone (VERAMYST) 27.5 mcg/actuation nasal spray One spray in each nostril daily     hydroCHLOROthiazide (HYDRODIURIL) 12.5 MG tablet Take 1 tablet (12.5 mg total) by mouth daily.     losartan (COZAAR) 100 MG tablet Take 1 tablet (100 mg total) by mouth daily.     MICROCHAMBER Spcr FOR HOME USE WITH INHALER     multivitamin capsule Take 1 capsule by mouth daily.     pantoprazole (PROTONIX) 40 MG  "tablet Take 40 mg by mouth daily as needed.      potassium chloride (K-DUR,KLOR-CON) 20 MEQ tablet Take 1 tablet (20 mEq total) by mouth daily.     rosuvastatin (CRESTOR) 5 MG tablet Take 1 tablet (5 mg total) by mouth at bedtime.     SPIRIVA WITH HANDIHALER 18 mcg inhalation capsule ONE CAPSULE (2 PUFF TOTAL) INTO INHALER AND INHALE DAILY     VENTOLIN HFA 90 mcg/actuation inhaler Inhale 2 puffs every 4 (four) hours as needed.     vitamin E 400 UNIT capsule Take 400 Units by mouth daily.     azithromycin (ZITHROMAX Z-YOVANNY) 250 MG tablet Take 2 tablets (500 mg) on  Day 1,  followed by 1 tablet (250 mg) once daily on Days 2 through 5.     predniSONE (DELTASONE) 20 MG tablet Take 20 mg by mouth 2 (two) times a day for 5 days.         Objective:  Vitals:    09/01/20 1431 09/01/20 1449   BP: (!) 152/97 148/89   Pulse: 74    Resp: 16    Temp: 96.7  F (35.9  C)    TempSrc: Oral    Weight: 107 lb 8 oz (48.8 kg)    Height: 4' 10.5\" (1.486 m)        Body mass index is 22.09 kg/m .    Vital signs reviewed and stable  General: No acute distress  Psych: Appropriate affect  HEENT: moist mucous membranes, pupils equal, round, reactive to light and accomodation, tympanic membranes are pearly grey bilaterally  Lymph: no cervical or supraclavicular lymphadenopathy  Cardiovascular: regular rate and rhythm with no murmur  Pulmonary: Moderate air movement throughout with wheezing in the expiratory phase throughout.  Coughing with deep breathing.  Abdomen: soft, non tender, non distended with normo-active bowel sounds  Extremities: warm and well perfused with no edema  Skin: warm and dry with no rash         This note has been dictated and transcribed using voice recognition software.   Any errors in transcription are unintentional and inherent to the software.  "

## 2021-06-12 NOTE — TELEPHONE ENCOUNTER
Controlled Substance Refill Request  Medication Name:   Requested Prescriptions     Pending Prescriptions Disp Refills     ALPRAZolam (XANAX XR) 1 MG 24 hr tablet [Pharmacy Med Name: ALPRAZolam ER 1 MG Oral Tablet Extended Release 24 Hour] 60 tablet 0     Sig: TAKE 1 TABLET BY MOUTH AT NOON AND AT BEDTIME     Date Last Fill: 10/1/20  Requested Pharmacy: [facility]  Submit electronically to pharmacy  Controlled Substance Agreement on file:   Encounter-Level CSA Scan Date:    There are no encounter-level csa scan date.        Last office visit:  9/1/20

## 2021-06-12 NOTE — PROGRESS NOTES
Assessment/ Plan     1. Fatigue  Discussed with patient that fatigue is likely multifactorial and we typically do not find one treatable cause.  She does admit that she has been under a lot of stress and we did discuss that anxiety and depression can affect fatigue.  It sounds as though she has had a very thorough workup from her primary care physician, Dr. Baljit Maza.  I offered her to sign for records and I would review everything that is done thus far and decide where to go from there.  The better option I discussed would be to return to her primary care physician for further evaluation if he thinks it is necessary.  She came to me today as she felt like this was may be a female issue as she was having some urinary urgency.  I assured her that I do not think the urgency has anything to do with her symptoms of fatigue.  I think she may benefit from a therapist, but does not want to do that at this point.      2. Urinary urgency  Patient is having mild urinary urgency with a negative UA.  She has had a complete hysterectomy in the past and a bladder sling surgery.  She is not having any incontinence.  Her symptoms are fairly mild, not severe enough that she would consider taking the medication.  Is not interfering with her daily activities.  Discussed certainly this could be made worse with constipation.  It sounds as though she has had a very thorough GI workup including CAT scans and colonoscopies.  - Urinalysis-UC if Indicated  - Culture, Urine      Subjective:       Arabella Suero is a 81 y.o. female who presents for a multitude of symptoms.  This patient is new to me at the [facility].  I saw her a couple of times over 10 years ago at the [place] office when I used to work there.  She then moved to [place]  for many years and has recently moved back.  She does have a primary care physician, Dr. Baljit Maza.  Patient's historical narrative is somewhat tangential and difficult to keep on  "track.  Unfortunately, I cannot get into much of his records today to see what kind of workup she has had done.  Her first symptom that she talks about is urinary urgency.  She is not having accidents or leaking.  It is not causing her to get up at night.  It is not interfering with things that she can do outside the home.  She has no dysuria or fevers.  She has had a total hysterectomy many years ago for what sounds like possibly endometrial cancer.  She then had a bladder sling procedure.  She did see an OB/GYN last winter in [place] who told her it sounds like maybe she had a little bit of bladder prolapse, but not to the extent that she would need another surgery.  She also is having some left flank pain for several years.  When asked about prior evaluation.  It sounds as though she has had a couple of abdominal CTs.  When I can see from a hospitalization in March.  This was completely normal.  She also states she has been following with [place] [facility] and they found nothing wrong and just told her to treat the constipation.  She has had no blood in her stool or weight loss.  She is also feeling very fatigued and \"just not right\" over the last year and a half.  She does admit to a lot of stress.  Her grandson  and this prompted her to move back home to be here with her daughter.  She herself has a son that  of suicide and she was worried about her daughter.  Her  sees 1 of my partners, Dr. Scott.  He has had a lot of medical issues that she has helped out with.  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  Now she states after a couple of hours  She did recently see the cardiologist and had some imaging done.  It does show some coronary artery disease, but they did not feel they need to aggressively intervene.  She also has had some teeth pulled in the last year and she is wondering if that could cause the issue.  She does not think she had active " "infection.  We did discuss that anxiety and depression can certainly cause one to feel fatigued.  I offered her to establish with a therapist but she did not think she needed it.  She also then proceeded to tell me about some carpal tunnel surgery that she had an abnormal EKG which caused her a lot of stress.      Relevant past medical, family, surgical, and social history reviewed with patient, unless noted in HPI, not pertinent for this visit.    Review of Systems   A 12 point comprehensive review of systems was negative except as noted.      Current Outpatient Prescriptions   Medication Sig Dispense Refill     ALPRAZolam (XANAX) 0.5 MG tablet Take 0.5 mg by mouth daily.       ALPRAZolam (XANAX) 1 MG tablet Take 1 mg by mouth 2 (two) times a day.       amLODIPine (NORVASC) 5 MG tablet Take 5 mg by mouth daily.       aspirin 81 MG EC tablet Take 81 mg by mouth daily.       atenolol (TENORMIN) 25 MG tablet Take 25 mg by mouth daily.       cholecalciferol, vitamin D3, (VITAMIN D3) 2,000 unit cap Take 2,000 Units by mouth daily.       fluticasone (FLONASE) 50 mcg/actuation nasal spray 1 spray into each nostril as needed for rhinitis.       hydroCHLOROthiazide (HYDRODIURIL) 12.5 MG tablet Take 12.5 mg by mouth daily.       losartan (COZAAR) 100 MG tablet Take 100 mg by mouth daily.       multivitamin capsule Take 1 capsule by mouth daily.       potassium chloride SA (K-DUR,KLOR-CON) 20 MEQ tablet Take 20 mEq by mouth daily.       vitamin E 400 UNIT capsule Take 400 Units by mouth daily.       No current facility-administered medications for this visit.        Objective:      /68  Pulse 70  Temp 97.7  F (36.5  C) (Oral)   Resp 14  Ht 4' 11\" (1.499 m)  Wt 114 lb (51.7 kg)  BMI 23.03 kg/m2      General appearance: alert, appears stated age and cooperative  Lungs: clear to auscultation bilaterally  Heart: regular rate and rhythm, S1, S2 normal, no murmur, click, rub or gallop  Abdomen: soft, non-tender; bowel " sounds normal; no masses,  no organomegaly  Extremities: extremities normal, atraumatic, no cyanosis or edema      Recent Results (from the past 168 hour(s))   Urinalysis-UC if Indicated   Result Value Ref Range    Color, UA Yellow Colorless, Yellow, Straw, Light Yellow    Clarity, UA Clear Clear    Glucose, UA Negative Negative    Bilirubin, UA Negative Negative    Ketones, UA Negative Negative    Specific Gravity, UA 1.015 1.005 - 1.030    Blood, UA Trace (!) Negative    pH, UA 7.5 5.0 - 8.0    Protein, UA Negative Negative mg/dL    Urobilinogen, UA 0.2 E.U./dL 0.2 E.U./dL, 1.0 E.U./dL    Nitrite, UA Negative Negative    Leukocytes, UA Trace (!) Negative    Bacteria, UA None Seen None Seen hpf    RBC, UA 0-2 None Seen, 0-2 hpf    WBC, UA 0-5 None Seen, 0-5 hpf    Squam Epithel, UA 0-5 None Seen, 0-5 lpf          This note has been dictated using voice recognition software. Any grammatical or context distortions are unintentional and inherent to the software

## 2021-06-13 NOTE — TELEPHONE ENCOUNTER
FYI - Status Update  Who is Calling: Patient  Update: I just want to let Rain Martinez MD know I think she is a very good doctor but due to my  having a stroke I will not be returning to this clinic.  My  use to drive me but now he cant.   Okay to leave a detailed message?:  Yes

## 2021-06-13 NOTE — TELEPHONE ENCOUNTER
Refill Approved    Rx renewed per Medication Renewal Policy. Medication was last renewed on 3/12/20.    Ayana Fenton, [facility] Triage/Med Refill 12/18/2020     Requested Prescriptions   Pending Prescriptions Disp Refills     losartan (COZAAR) 100 MG tablet [Pharmacy Med Name: Losartan Potassium 100 MG Oral Tablet] 90 tablet 0     Sig: Take 1 tablet by mouth once daily       Angiotensin Receptor Blocker Protocol Passed - 12/16/2020 11:12 AM        Passed - PCP or prescribing provider visit in past 12 months       Last office visit with prescriber/PCP: 9/12/2017 Nikky Oliva MD OR same dept: 12/17/2019 Rain Martinez MD OR same specialty: 9/1/2020 Rain Martinez MD  Last physical: Visit date not found Last MTM visit: Visit date not found   Next visit within 3 mo: Visit date not found  Next physical within 3 mo: Visit date not found  Prescriber OR PCP: Nikky Oliva MD  Last diagnosis associated with med order: 1. Essential hypertension  - losartan (COZAAR) 100 MG tablet [Pharmacy Med Name: Losartan Potassium 100 MG Oral Tablet]; Take 1 tablet by mouth once daily  Dispense: 90 tablet; Refill: 0    If protocol passes may refill for 12 months if within 3 months of last provider visit (or a total of 15 months).             Passed - Serum potassium within the past 12 months     Lab Results   Component Value Date    Potassium 4.0 09/18/2020             Passed - Blood pressure filed in past 12 months     BP Readings from Last 1 Encounters:   09/01/20 148/89             Passed - Serum creatinine within the past 12 months     Creatinine   Date Value Ref Range Status   09/18/2020 0.71 0.60 - 1.10 mg/dL Final

## 2021-06-13 NOTE — TELEPHONE ENCOUNTER
Controlled Substance Refill Request  Medication Name:   Requested Prescriptions     Pending Prescriptions Disp Refills     ALPRAZolam (XANAX XR) 1 MG 24 hr tablet [Pharmacy Med Name: ALPRAZolam XR 1 MG Oral Tablet Extended Release 24 Hour] 60 tablet 0     Sig: TAKE 1 TABLET BY MOUTH AT NOON AND AT BEDTIME     Date Last Fill: 11/6/20  Requested Pharmacy: [facility]  Submit electronically to pharmacy  Controlled Substance Agreement on file:   Encounter-Level CSA Scan Date:    There are no encounter-level csa scan date.        Last office visit:  9/1/20

## 2021-06-13 NOTE — TELEPHONE ENCOUNTER
Controlled Substance Refill Request  Medication Name:   Requested Prescriptions     Pending Prescriptions Disp Refills     SPIRIVA WITH HANDIHALER 18 mcg inhalation capsule [Pharmacy Med Name: Spiriva HandiHaler 18 MCG Inhalation Capsule] 90 capsule 0     Sig: ONE CAPSULE (2 PUFF TOTAL) INTO INHALER AND INHALE DAILY     VENTOLIN HFA 90 mcg/actuation inhaler [Pharmacy Med Name: Ventolin  (90 Base) MCG/ACT Inhalation Aerosol Solution] 36 g 0     Sig: INHALE 2 PUFFS BY MOUTH EVERY 4 HOURS AS NEEDED     ALPRAZolam (XANAX XR) 1 MG 24 hr tablet [Pharmacy Med Name: ALPRAZolam XR 1 MG Oral Tablet Extended Release 24 Hour] 60 tablet 0     Sig: TAKE 1 TABLET BY MOUTH AT NOON AND AT BEDTIME     Date Last Fill: 11/6/20  Requested Pharmacy: [facility]  Submit electronically to pharmacy  Controlled Substance Agreement on file:   Encounter-Level CSA Scan Date:    There are no encounter-level csa scan date.        Last office visit:  9/1/20      Rx renewed per Medication Renewal policy  Ventolin, spiriva

## 2021-06-13 NOTE — TELEPHONE ENCOUNTER
Refill needs to come through  - she needs to contact pharmacy and have them send it to Dr Martinez at [facility]

## 2021-06-14 NOTE — TELEPHONE ENCOUNTER
Please contact this patient and let them know that I am now working at the [facility].  I am unable to refill any controlled substance medications through [employer].    Please have them contact their pharmacy to direct the refill request to the [facility].    If it has been over 6 months since I have seen them either in person or for virtual visit they should call [phone] to make an appointment for evaluation.    Thanks!    Dr Martinez

## 2021-06-15 PROBLEM — E87.1 HYPONATREMIA: Status: ACTIVE | Noted: 2017-04-19

## 2021-06-15 PROBLEM — E87.6 HYPOKALEMIA: Status: ACTIVE | Noted: 2017-04-19

## 2021-06-16 PROBLEM — M19.049 OSTEOARTHRITIS OF HAND: Status: ACTIVE | Noted: 2019-11-22

## 2021-06-16 PROBLEM — R19.5 ABNORMAL FECES: Status: ACTIVE | Noted: 2019-07-02

## 2021-06-16 PROBLEM — F41.1 ANXIETY, GENERALIZED: Status: ACTIVE | Noted: 2019-11-22

## 2021-06-16 PROBLEM — R10.13 EPIGASTRIC PAIN: Status: ACTIVE | Noted: 2018-03-12

## 2021-06-16 PROBLEM — I25.84 CORONARY ARTERY DISEASE DUE TO CALCIFIED CORONARY LESION: Status: ACTIVE | Noted: 2017-08-09

## 2021-06-16 PROBLEM — F22 PARANOIA (H): Status: ACTIVE | Noted: 2021-05-28

## 2021-06-16 PROBLEM — R11.0 NAUSEA: Status: ACTIVE | Noted: 2017-07-28

## 2021-06-16 PROBLEM — E55.9 VITAMIN D DEFICIENCY: Status: ACTIVE | Noted: 2019-11-22

## 2021-06-16 PROBLEM — R19.4 BOWEL HABIT CHANGES: Status: ACTIVE | Noted: 2020-08-28

## 2021-06-16 PROBLEM — J43.2 CENTRILOBULAR EMPHYSEMA (H): Status: ACTIVE | Noted: 2021-05-28

## 2021-06-16 PROBLEM — K62.5 RECTAL HEMORRHAGE: Status: ACTIVE | Noted: 2020-08-28

## 2021-06-16 PROBLEM — I25.10 CORONARY ARTERY DISEASE DUE TO CALCIFIED CORONARY LESION: Status: ACTIVE | Noted: 2017-08-09

## 2021-06-16 PROBLEM — K57.30 DIVERTICULAR DISEASE OF LARGE INTESTINE: Status: ACTIVE | Noted: 2020-06-22

## 2021-06-16 PROBLEM — E83.42 HYPOMAGNESEMIA: Status: ACTIVE | Noted: 2021-05-28

## 2021-06-16 PROBLEM — E44.0 MODERATE PROTEIN-CALORIE MALNUTRITION (H): Status: ACTIVE | Noted: 2021-05-28

## 2021-06-16 PROBLEM — R19.5 LOOSE STOOLS: Status: ACTIVE | Noted: 2020-08-28

## 2021-06-16 PROBLEM — G93.40 ENCEPHALOPATHY: Status: ACTIVE | Noted: 2021-05-29

## 2021-06-16 PROBLEM — M76.899 ENTHESOPATHY OF HIP REGION: Status: ACTIVE | Noted: 2019-11-22

## 2021-06-16 NOTE — TELEPHONE ENCOUNTER
----- Message -----  From: Milena Ortiz  Sent: 4/20/2021  10:06 AM CDT  To: Ayana Soto RN     The medication or refill issue is below:    Primary Cardiologist: Dr. Ospina    Medication: rosuvastatin (CRESTOR) 10 MG tablet    Issue / Concern: Patient is having a hard time understanding what was sent to the pharmacy please advise    Preferred Pharmacy/City: [facility] [address] [place] [address] E    Best Phone Number for Patient: [phone]    Additional Info:

## 2021-06-16 NOTE — TELEPHONE ENCOUNTER
----- Message from CARLI Walker sent at 4/16/2021  3:58 PM CDT -----  Regarding: GONZALO PATIENT  General phone call:    Caller: PATIENT    Primary cardiologist: GONZALO     Detailed reason for call: PATIENT HAS QUESTIONS RE; HER NEW RX FOR rosuvastatin (CRESTOR) 10 MG tablet. PLEASE CALL PATIENT.    Best phone number: [phone]    Best time to contact: ANY    Ok to leave a detailed message? YES    Device? NO      Additional Info:

## 2021-06-16 NOTE — TELEPHONE ENCOUNTER
Telephone Encounter by Cherrie Milton LPN at 2019 12:24 PM     Author: Cherrie Milton LPN Service: -- Author Type: Licensed Nurse    Filed: 2019 12:24 PM Encounter Date: 2019 Status: Signed    : Cherrie Milton LPN (Licensed Nurse)       Patient Returning Call  Reason for call:  Patient returning call   Information relayed to patient:  Angelic Ellis CMA           4:32 PM   Note      Pt came by the clinic today and dropped off a form from "[employer] that states pt's Rx Approval for Alprazolam ER is going to  on 2020. [facility] printed out form for MD to fill out, MD just needs to complete and sign and CMA will fax to the number listed on the form.      BB- Please advise when you get back into the clinic next week. Form located on your desk for completion. Thank you.            Parul Venegas LPN           4:01 PM   Note      Left message for pt to call back.  I spoke to the pharmacy and they said a prior auth is not needed for medication.  Pt already picked up 1mg tablets and it was too early to  0.5mg tablets.     Was there something more pt needed?      Patient has additional questions:  No  If YES, what are your questions/concerns:  None   Okay to leave a detailed message?: No

## 2021-06-16 NOTE — TELEPHONE ENCOUNTER
Pt has decided against having NM stress test at this time and would like to cancel her appt scheduled 5/4/21 - she has stomach issues and does not want anything injected into her.    Pt also adds that the rosuvastatin increase was sent to the pharmacy incorrectly.  Pt was instructed to increase dose to 10 mg at bedtime, and rx was sent for 10 mg tab - take 1/2 tab at bedtime.  She adds that it gave her stomach issues when she took 2 of hr 5 mg tabs, so she doesn't want the correct rx submitted at this time.    Plan made to follow-up with pt towards the end of this week.  -grace

## 2021-06-17 NOTE — ED PROVIDER NOTES
ED CONSULTATION  Date/Time:5/22/2021 10:11 PM    I am seeing this patient along with DAMON Garcia.  IRamón have reviewed the documentation, personally taken the patient's history, performed an exam and agree with the physical finds, diagnosis and management plan.    HPI:  Patient presents to the emergency department with bilateral eye pain.  Patient reporting that she used a sanitizing wipe this morning.  She has sanitizing wipes around the house she does not know the brand name.  She felt that it was a good option to clean her face and for the first time today wiped her face and eyes with the sanitizing wipe.  Shortly thereafter started to have increasing eye discomfort and irritation.  Presents the emergency department for additional evaluation.  Reports that she can see but is because it is uncomfortable for her to open her eyes when she does open her eyes she reports her vision is okay.      PHYSICAL EXAM    VITAL SIGNS: /89   Pulse 74   Resp 24   Wt 114 lb (51.7 kg)   SpO2 97%   BMI 23.42 kg/m    Constitutional:  Well developed, well nourished, elderly female, appears uncomfortable.  EYES: Bilateral conjunctival irritation.  No evidence of foreign body.  Pupils equal and reactive.  Anterior chambers clear.  Visual fields grossly normal.  HENT: Atraumatic, normocephalic, bilateral external ears normal.  Oropharynx moist. Nose normal.   Neck: Normal ROM , Supple   Respiratory:  No respiratory distress, normal nonlabored respirations.   Cardiovascular:  Distal perfusion appears intact  Musculoskeletal:  No edema appreciated, Good range of motion in all major joints.   Integument:  Warm, Dry, No erythema, No rash.   Neurologic:  Alert and oriented. No focal deficits noted.  Ambulatory  Psychiatric:  Affect normal, Judgment normal, Mood normal.    MDM:  Clinical history and examination most consistent with acute chemical conjunctivitis.  Case was discussed with poison control.  Patient  to be discharged.  Patient will be placed on topical antibiotics.  Counseled on appropriate management follow-up and instructions to avoid utilizing sanitizing wipes on face.  Please see PA note for further details on ED course and plan.       1. Chemical conjunctivitis of both eyes    2. Hypokalemia        Results for orders placed or performed during the hospital encounter of 05/22/21   Basic Metabolic Panel   Result Value Ref Range    Sodium 130 (L) 136 - 145 mmol/L    Potassium 3.2 (L) 3.5 - 5.0 mmol/L    Chloride 94 (L) 98 - 107 mmol/L    CO2 26 22 - 31 mmol/L    Anion Gap, Calculation 10 5 - 18 mmol/L    Glucose 88 70 - 125 mg/dL    Calcium 8.6 8.5 - 10.5 mg/dL    BUN 9 8 - 28 mg/dL    Creatinine 0.65 0.60 - 1.10 mg/dL    GFR MDRD Af Amer >60 >60 mL/min/1.73m2    GFR MDRD Non Af Amer >60 >60 mL/min/1.73m2   Erythrocyte Sedimentation Rate   Result Value Ref Range    Sed Rate 5 0 - 20 mm/hr   HM2(CBC w/o Differential)   Result Value Ref Range    WBC 5.5 4.0 - 11.0 thou/uL    RBC 4.70 3.80 - 5.40 mill/uL    Hemoglobin 13.8 12.0 - 16.0 g/dL    Hematocrit 39.6 35.0 - 47.0 %    MCV 84 80 - 100 fL    MCH 29.4 27.0 - 34.0 pg    MCHC 34.8 32.0 - 36.0 g/dL    RDW 12.7 11.0 - 14.5 %    Platelets 241 140 - 440 thou/uL    MPV 8.6 8.5 - 12.5 fL   POCT Glucose    Specimen: Capillary; Blood   Result Value Ref Range    Glucose 91 70 - 139 mg/dL     No results found.     New Prescriptions    POLYMYXIN B-TRIMETHOPRIM (POLYTRIM) 10,000 UNIT- 1 MG/ML DROP OPHTHALMIC DROPS    Place one drop in both eyes every 4 hours for 5 days.       Final disposition will be per the depending diagnostic studies and patient's clinical trajectory.     Ramón Jorgensen MD  05/22/21 [id number]

## 2021-06-17 NOTE — PATIENT INSTRUCTIONS - HE
Patient Instructions by Rain Martinez MD at 1/14/2020 11:00 AM     Author: Rain Martinez MD Service: -- Author Type: Physician    Filed: 1/16/2020 10:41 AM Encounter Date: 1/14/2020 Status: Signed    : Rain Martinez MD (Physician)         Patient Education     Exercise for a Healthier Heart  You may wonder how you can improve the health of your heart. If youre thinking about exercise, youre on the right track. You dont need to become an athlete, but you do need a certain amount of brisk exercise to help strengthen your heart. If you have been diagnosed with a heart condition, your doctor may recommend exercise to help stabilize your condition. To help make exercise a habit, choose safe, fun activities.       Be sure to check with your health care provider before starting an exercise program.    Why exercise?  Exercising regularly offers many healthy rewards. It can help you do all of the following:    Improve your blood cholesterol levels to help prevent further heart trouble    Lower your blood pressure to help prevent a stroke or heart attack    Control diabetes, or reduce your risk of getting this disease    Improve your heart and lung function    Reach and maintain a healthy weight    Make your muscles stronger and more limber so you can stay active    Prevent falls and fractures by slowing the loss of bone mass (osteoporosis)    Manage stress better  Exercise tips  Ease into your routine. Set small goals. Then build on them.  Exercise on most days. Aim for a total of 150 or more minutes of moderate to  vigorous intensity activity each week. Consider 40 minutes, 3 to 4 times a week. For best results, activity should last for 40 minutes on average. It is OK to work up to the 40 minute period over time. Examples of moderate-intensity activity is walking one mile in 15 minutes or 30 to 45 minutes of yard work.  Step up your daily activity level. Along with your exercise program,  try being more active throughout the day. Walk instead of drive. Do more household tasks or yard work.  Choose one or more activities you enjoy. Walking is one of the easiest things you can do. You can also try swimming, riding a bike, or taking an exercise class.  Stop exercising and call your doctor if you:    Have chest pain or feel dizzy or lightheaded    Feel burning, tightness, pressure, or heaviness in your chest, neck, shoulders, back, or arms    Have unusual shortness of breath    Have increased joint or muscle pain    Have palpitations or an irregular heartbeat      9131-0920 [employer] [address] [place] [address]. All rights reserved. This information is not intended as a substitute for professional medical care. Always follow your healthcare professional's instructions.         Patient Education   Understanding Advance Care Planning  Advance care planning is the process of deciding ones own future medical care. It helps ensure that if you cant speak for yourself, your wishes can still be carried out. The plan is a series of legal documents that note a persons wishes. The documents vary by state. Advance care planning may be done when a person has a serious illness that is expected to get worse. It may be done before major surgery. And it can help you and your family be prepared in case of a major illness or injury. Advance care planning helps with making decisions at these times.       A health care proxy is a person who acts as the voice of a patient when the patient cant speak for himself or herself. The name of this role varies by state. It may be called a Durable Medical Power of  or Durable Power of  for Healthcare. It may be called an agent, surrogate, or advocate. Or it may be called a representative or decision maker. It is an official duty that is identified by a legal document. The document also varies by state.    Why Is Advance Care Planning  Important?  If a person communicates their healthcare wishes:    They will be given medical care that matches their values and goals.    Their family members will not be forced to make decisions in a crisis with no guidance.  Creating a Plan  Making an advance care plan is often done in 3 steps:    Thinking about ones wishes. To create an advance care plan, you should think about what kind of medical treatment you would want if you lose the ability to communicate. Are there any situations in which you would refuse or stop treatment? Are there therapies you would want or not want? And whom do you want to make decisions for you? There are many places to learn more about how to plan for your care. Ask your doctor or  for resources.    Picking a health care proxy. This means choosing a trusted person to speak for you only when you cant speak for yourself. When you cannot make medical decisions, your proxy makes sure the instructions in your advance care plan are followed. A proxy does not make decisions based on his or her own opinions. They must put aside those opinions and values if needed, and carry out your wishes.    Filling out the legal documents. There are several kinds of legal documents for advance care planning. Each one tells health care providers your wishes. The documents may vary by state. They must be signed and may need to be witnessed or notarized. You can cancel or change them whenever you wish. Depending on your state, the documents may include a Healthcare Proxy form, Living Will, Durable Medical Power of , Advance Directive, or others.  The Familys Role  The best help a family can give is to support their loved ones wishes. Open and honest communication is vital. Family should express any concerns they have about the patients choices while the patient can still make decisions.    6427-2147 [employer]. [address] [place] [address], [place] [address]. All rights  reserved. This information is not intended as a substitute for professional medical care. Always follow your healthcare professional's instructions.         Also, [facility] offers a free, downloadable health care directive that allows you to share your treatment choices and personal preferences if you cannot communicate your wishes. It also allows you to appoint another person (called a health care agent) to make health care decisions if you are unable to do so. You can download an advance directive by going here: [email or web address]     Patient Education   Personalized Prevention Plan  You are due for the preventive services outlined below.  Your care team is available to assist you in scheduling these services.  If you have already completed any of these items, please share that information with your care team to update in your medical record.  Health Maintenance   Topic Date Due   ? ADVANCE CARE PLANNING  09/26/1953   ? ZOSTER VACCINES (1 of 2) 09/26/1986   ? MEDICARE ANNUAL WELLNESS VISIT  09/26/2001   ? DXA SCAN  09/26/2001   ? TD 18+ HE  09/16/2018   ? FALL RISK ASSESSMENT  01/14/2021   ? PNEUMOCOCCAL IMMUNIZATION 65+ LOW/MEDIUM RISK  Completed   ? INFLUENZA VACCINE RULE BASED  Completed

## 2021-06-17 NOTE — ED TRIAGE NOTES
"Pt presents to the ED with c/o of eye pain. Pt states \"I can't see\".  Pt reports waking up from a nap with pain and unable to see out of her left eye. Pt reports having hx of stroke 20 years ago.   "

## 2021-06-17 NOTE — ED PROVIDER NOTES
EMERGENCY DEPARTMENT ENCOUNTER      NAME: Arabella Suero  AGE: 85 y.o. female  YOB: 1935  MRN: [id number]  EVALUATION DATE & TIME: 2021  9:06 PM    PCP: Diana Oconnor PA-C    ED PROVIDER: India Vargas PA-C      Chief Complaint   Patient presents with     Eye Pain     Headache         FINAL IMPRESSION:  1. Chemical conjunctivitis of both eyes    2. Hypokalemia          MEDICAL DECISION MAKIN y.o. female who wears contact lenses presents to the Emergency Department for evaluation of bilateral eye pain and tearing, which began around 2pm today (7 hours PTA). Patient used a sanitizing wipe to wash her face earlier today and did wipe her eyes with this. Symptoms began afterward. Patient unable to take out her contacts due to the pain.  When she does open her eyes, her vision is okay.    Vital signs notable for blood pressure 187/95, all else normal. On exam, patient is uncomfortable and both eyes are closed.  Clear tearing from both eyes.  With opening of the eyes, patient has bilateral conjunctival irritation.  Contact lenses were removed. Pupils are equal and reactive.  Anterior chambers are clear.  Patient had immediate relief of her discomfort with proparacaine drops.  No foreign bodies in the eyes.  Fluorescein exam is unremarkable and does not show any corneal abrasions, ulcerations, or other abnormal uptake. Vision is normal.  Patient's exam is most consistent with acute chemical conjunctivitis, likely secondary to the sanitizing wipe.  She is unsure of what brand this was or any of the ingredients.  I discussed this case with poison control who recommends irrigation, prophylactic antibiotic drops, and close ophthalmology follow-up.  Patient's exam is very atypical for acute glaucoma, iritis, keratitis, hyphema, optic neuritis, periorbital/orbital cellulitis.  She does not have any headache, dizziness, neurologic deficits on exam that would suggest acute intracranial pathology such as  CVA, SAH, intracranial mass.  I do not believe neuroimaging would be of benefit.  Patient does not have any temporal tenderness but given the bilateral eye pain, will obtain ESR to rule out temporal arteritis.    Labs are notable for hypokalemia of 3.2, chronic hyponatremia of 130, normal electrolytes otherwise, normal kidney function, ESR normal at 5, WBC normal at 5.5, hemoglobin normal at 13.8.  Patient is now able to open her eyes comfortably with repeat administration of proparacaine drops.  Eyes were irrigated.  I do not believe further work-up in the emergency department is indicated.  I believe patient is appropriate for outpatient management and ophthalmology follow-up.  I will send her home with Polytrim eyedrops for infection prophylaxis.  I encouraged patient to forego contact use until she is seen by ophthalmology.  I discussed other supportive cares and return precautions, including vision loss, worsening pain. Patient is agreeable and discharged in a much more comfortable, ambulatory state.     At the conclusion of the encounter I discussed the results of all of the tests and the disposition. The questions were answered. The patient or family acknowledged understanding and was agreeable with the care plan.     I saw this patient alongside Dr. Jorgensen. Please see his note for detail.     ED COURSE:  9:05 PM I met with the patient, obtained history, performed an initial exam, and discussed options and plan for diagnostics and treatment here in the ED.   9:28 PM Contact lenses were removed. I gave the patient proparacaine eye drops which gave her relief.   9:54 PM I performed woods lamp exam.   10:09 PM I staffed the patient with Dr. Jorgensen  10:25 PM I spoke with poison control after patient mentioned she had wiped her face and eyes with some sort of sanitizing wipe today.   10:36 PM Patient feeling better, can now open eyes without difficulty and walked to the bathroom.  Will be discharged home.      MEDICATIONS GIVEN IN THE EMERGENCY:  Medications   sodium chloride flush 10 mL (NS) (has no administration in time range)   proparacaine 0.5 % ophthalmic solution 1 drop (ALCAINE) (1 drop Both Eyes Given 5/22/21 2113)   fluorescein ophthalmic strip 1 strip (for FLUOR-I-STRIP) (1 strip Both Eyes Given 5/22/21 2135)   potassium chloride CR tablet 40 mEq (K-DUR,KLOR-CON) (40 mEq Oral Given 5/22/21 2247)       NEW PRESCRIPTIONS STARTED AT TODAY'S ER VISIT  Discharge Medication List as of 5/22/2021 10:43 PM      START taking these medications    Details   polymyxin B-trimethoprim (POLYTRIM) 10,000 unit- 1 mg/mL Drop ophthalmic drops Place one drop in both eyes every 4 hours for 5 days., Normal         CONTINUE these medications which have NOT CHANGED    Details   albuterol (PROVENTIL) 2.5 mg /3 mL (0.083 %) nebulizer solution Take 2.5 mg by nebulization every 6 (six) hours as needed. , Starting Wed 11/6/2019, Historical Med      ALPRAZolam (XANAX XR) 1 MG 24 hr tablet TAKE 1 TABLET BY MOUTH AT NOON AND AT BEDTIME, Normal      ALPRAZolam (XANAX) 0.5 MG tablet TAKE 1 TABLET BY MOUTH ONCE DAILY AS NEEDED FOR ANXIETY, Normal      amLODIPine (NORVASC) 5 MG tablet Take 1 tablet (5 mg total) by mouth at bedtime., Starting Mon 1/20/2020, Normal      aspirin 81 MG EC tablet Take 81 mg by mouth daily., Until Discontinued, Historical Med      atenoloL (TENORMIN) 25 MG tablet Take 1 tablet (25 mg total) by mouth daily., Starting Mon 1/20/2020, Normal      cetirizine (ZYRTEC) 10 MG tablet Take 1 tablet (10 mg total) by mouth daily., Starting Fri 7/10/2020, OTC      cholecalciferol, vitamin D3, (VITAMIN D3) 2,000 unit cap Take 2,000 Units by mouth daily., Until Discontinued, Historical Med      diclofenac sodium (VOLTAREN) 1 % Gel Historical Med      fluticasone (VERAMYST) 27.5 mcg/actuation nasal spray One spray in each nostril daily, Historical Med      hydroCHLOROthiazide (HYDRODIURIL) 12.5 MG tablet Take 1 tablet (12.5 mg  total) by mouth daily., Starting Mon 1/20/2020, Normal      losartan (COZAAR) 100 MG tablet Take 1 tablet by mouth once daily, Normal      MICROCHAMBER Spcr FOR HOME USE WITH INHALER, Historical Med      multivitamin capsule Take 1 capsule by mouth daily., Until Discontinued, Historical Med      pantoprazole (PROTONIX) 40 MG tablet Take 40 mg by mouth daily as needed. , Historical Med      potassium chloride (K-DUR,KLOR-CON) 20 MEQ tablet Take 1 tablet (20 mEq total) by mouth daily., Starting Sun 11/10/2019, Normal      rosuvastatin (CRESTOR) 10 MG tablet Take 0.5 tablets (5 mg total) by mouth at bedtime., Starting Fri 4/16/2021, Normal      SPIRIVA WITH HANDIHALER 18 mcg inhalation capsule ONE CAPSULE (2 PUFF TOTAL) INTO INHALER AND INHALE DAILY, Normal      VENTOLIN HFA 90 mcg/actuation inhaler INHALE 2 PUFFS BY MOUTH EVERY 4 HOURS AS NEEDED, Normal      vitamin E 400 UNIT capsule Take 400 Units by mouth daily., Until Discontinued, Historical Med                =================================================================    HPI    Patient information was obtained from: The patient     Use of : N/A      Arabella Suero is a 85 y.o. female with a pertinent history of stroke, hypertension, hyperlipidemia, GERD, cancer, s/p hysterectomy, and breast surgery who presents to this ED via walk-in for evaluation of eye pain.     The patient reports ~1400 today she woke up from a nap and began to develop a burning pain in her bilateral eyes that hurt when she opens them and radiates into her head a little.  Associated blurry vision and nausea.  Notes that she had contacts in and tried to initially take them ou but is unsure if she got them out.  Patient has a history of stroke in which she had double vision, but this time denies double vision, numbness, tingling, or any other complaints at this time.     Patient has daily contacts that she changes daily. Ophthalmologist at [facility], but hasn't seen them in a  while.  She did have Lasix done for her eyes but no other eye surgeries.       REVIEW OF SYSTEMS   Review of Systems   Eyes: Positive for pain (burning) and visual disturbance (blurry - no double vision).   Gastrointestinal: Positive for nausea.   Neurological: Positive for headaches (associated to eye pain). Negative for numbness.   All other systems reviewed and are negative.       PAST MEDICAL HISTORY:  Past Medical History:   Diagnosis Date     Cancer (H)      GERD (gastroesophageal reflux disease)      Hyperlipidemia      Hypertension        PAST SURGICAL HISTORY:  Past Surgical History:   Procedure Laterality Date     ABDOMINAL SURGERY       BREAST SURGERY       carpel tunnel -left Left      HYSTERECTOMY             CURRENT MEDICATIONS:    No current facility-administered medications on file prior to encounter.      Current Outpatient Medications on File Prior to Encounter   Medication Sig     albuterol (PROVENTIL) 2.5 mg /3 mL (0.083 %) nebulizer solution Take 2.5 mg by nebulization every 6 (six) hours as needed.      ALPRAZolam (XANAX XR) 1 MG 24 hr tablet TAKE 1 TABLET BY MOUTH AT NOON AND AT BEDTIME     ALPRAZolam (XANAX) 0.5 MG tablet TAKE 1 TABLET BY MOUTH ONCE DAILY AS NEEDED FOR ANXIETY     amLODIPine (NORVASC) 5 MG tablet Take 1 tablet (5 mg total) by mouth at bedtime.     aspirin 81 MG EC tablet Take 81 mg by mouth daily.     atenoloL (TENORMIN) 25 MG tablet Take 1 tablet (25 mg total) by mouth daily.     cetirizine (ZYRTEC) 10 MG tablet Take 1 tablet (10 mg total) by mouth daily.     cholecalciferol, vitamin D3, (VITAMIN D3) 2,000 unit cap Take 2,000 Units by mouth daily.     diclofenac sodium (VOLTAREN) 1 % Gel      fluticasone (VERAMYST) 27.5 mcg/actuation nasal spray One spray in each nostril daily     hydroCHLOROthiazide (HYDRODIURIL) 12.5 MG tablet Take 1 tablet (12.5 mg total) by mouth daily.     losartan (COZAAR) 100 MG tablet Take 1 tablet by mouth once daily     MICROCHAMBER Spcr FOR HOME  USE WITH INHALER     multivitamin capsule Take 1 capsule by mouth daily.     pantoprazole (PROTONIX) 40 MG tablet Take 40 mg by mouth daily as needed.      potassium chloride (K-DUR,KLOR-CON) 20 MEQ tablet Take 1 tablet (20 mEq total) by mouth daily.     rosuvastatin (CRESTOR) 10 MG tablet Take 0.5 tablets (5 mg total) by mouth at bedtime.     SPIRIVA WITH HANDIHALER 18 mcg inhalation capsule ONE CAPSULE (2 PUFF TOTAL) INTO INHALER AND INHALE DAILY     VENTOLIN HFA 90 mcg/actuation inhaler INHALE 2 PUFFS BY MOUTH EVERY 4 HOURS AS NEEDED     vitamin E 400 UNIT capsule Take 400 Units by mouth daily.       ALLERGIES:  Allergies   Allergen Reactions     Other Drug Allergy (See Comments)      Antidepressants- drowsiness or hyper       FAMILY HISTORY:  Family History   Problem Relation Age of Onset     Congenital heart disease Father        SOCIAL HISTORY:   Social History     Socioeconomic History     Marital status:      Spouse name: Not on file     Number of children: Not on file     Years of education: Not on file     Highest education level: Not on file   Occupational History     Not on file   Social Needs     Financial resource strain: Not on file     Food insecurity     Worry: Not on file     Inability: Not on file     Transportation needs     Medical: Not on file     Non-medical: Not on file   Tobacco Use     Smoking status: Never Smoker     Smokeless tobacco: Never Used     Tobacco comment: Per patient, she never smoked but 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   Substance and Sexual Activity     Alcohol use: No     Drug use: No     Sexual activity: Not on file   Lifestyle     Physical activity     Days per week: Not on file     Minutes per session: Not on file     Stress: Not on file   Relationships     Social connections     Talks on phone: Not on file     Gets together: Not on file     Attends Oriental orthodox service: Not on file     Active member of  club or organization: Not on file     Attends meetings of clubs or organizations: Not on file     Relationship status: Not on file     Intimate partner violence     Fear of current or ex partner: Not on file     Emotionally abused: Not on file     Physically abused: Not on file     Forced sexual activity: Not on file   Other Topics Concern     Not on file   Social History Narrative     Not on file       VITALS:  Patient Vitals for the past 24 hrs:   BP Pulse Resp SpO2 Weight   05/22/21 2215 168/89 74 -- 97 % --   05/22/21 2200 164/89 79 -- 95 % --   05/22/21 2145 (!) 167/93 79 -- 96 % --   05/22/21 2130 173/80 81 -- 96 % --   05/22/21 2115 176/90 84 -- 97 % --   05/22/21 2109 (!) 187/95 91 24 97 % 114 lb (51.7 kg)       PHYSICAL EXAM    General Appearance:  Well developed, well nourished. Alert, cooperative, uncomfortable, nontoxic, appears stated age.   HENT: Normocephalic without obvious deformity, atraumatic. Mucous membranes moist. Oropharynx without erythema, exudate, uvular deviation, or soft palate edema. Neck is supple, no cervical lymphadenopathy, no nuchal rigidity. Normal left and right TM.   Eyes: Clear tearing from both eyes.  With opening of the eyes, patient has bilateral conjunctival irritation.  Contact lenses were removed. Pupils are equal and reactive.  Anterior chambers are clear.  Patient had immediate relief of her discomfort with proparacaine drops.  No foreign bodies in the eyes.  Fluorescein exam is unremarkable and does not show any corneal abrasions, ulcerations, or other abnormal uptake. Normal lids, no proptosis.   Respiratory: No distress. Lungs clear to ausculation bilaterally. No wheezes, rhonchi, rales, or stridor.  Cardiovascular: Regular rate and rhythm. No gallops, rubs, or murmurs. Capillary refill less than 2 seconds. No peripheral edema.   GI: Abdomen soft, nontender, normal bowel sounds. No rebound or guarding.   : No CVA tenderness.   Musculoskeletal: Moving all  extremities. No swelling. No deformity. Able to ambulate independently.   Integument: Warm, dry, no rashes, petechiae, or lesions.   Neurologic: Alert and orientated x3. No focal deficits. Normal motor function. Normal sensory function. GCS 15.   Psych: Normal mood and affect. Judgement normal.      LAB:  All pertinent labs reviewed and interpreted.  Results for orders placed or performed during the hospital encounter of 05/22/21   Basic Metabolic Panel   Result Value Ref Range    Sodium 130 (L) 136 - 145 mmol/L    Potassium 3.2 (L) 3.5 - 5.0 mmol/L    Chloride 94 (L) 98 - 107 mmol/L    CO2 26 22 - 31 mmol/L    Anion Gap, Calculation 10 5 - 18 mmol/L    Glucose 88 70 - 125 mg/dL    Calcium 8.6 8.5 - 10.5 mg/dL    BUN 9 8 - 28 mg/dL    Creatinine 0.65 0.60 - 1.10 mg/dL    GFR MDRD Af Amer >60 >60 mL/min/1.73m2    GFR MDRD Non Af Amer >60 >60 mL/min/1.73m2   Erythrocyte Sedimentation Rate   Result Value Ref Range    Sed Rate 5 0 - 20 mm/hr   HM2(CBC w/o Differential)   Result Value Ref Range    WBC 5.5 4.0 - 11.0 thou/uL    RBC 4.70 3.80 - 5.40 mill/uL    Hemoglobin 13.8 12.0 - 16.0 g/dL    Hematocrit 39.6 35.0 - 47.0 %    MCV 84 80 - 100 fL    MCH 29.4 27.0 - 34.0 pg    MCHC 34.8 32.0 - 36.0 g/dL    RDW 12.7 11.0 - 14.5 %    Platelets 241 140 - 440 thou/uL    MPV 8.6 8.5 - 12.5 fL   POCT Glucose    Specimen: Capillary; Blood   Result Value Ref Range    Glucose 91 70 - 139 mg/dL       RADIOLOGY:  Reviewed all pertinent imaging. Please see official radiology report.  No results found.    PROCEDURE:  PROCEDURE: WOOD'S LAMP   INDICATIONS: Conjunctivitis    PROCEDURE PROVIDER: India Vargas PA-C    SITE: both eyes   MEDICATION: Alcaine (Proparcaine) was applied by this physician to both eyes.     NOTE: both eyes exam:     no abrasion or injury, flourescein normal and no hyphema     COMPLICATIONS:  None     Ayan BULLOCK, am serving as a scribe to document services personally performed by India Vargas PA-C based on  my observation and the provider's statements to me. I, India Vargas PA-C, attest that Ayan Beckwith is acting in a scribe capacity, has observed my performance of the services and has documented them in accordance with my direction.      This note has been dictated using voice recognition software. Any grammatical or context distortions are unintentional and inherent to the software.      India Vargas PA-C  Emergency Medicine  [facility] EMERGENCY ROOM  [address] [place] [id number]  Dept: [phone]  Loc: [phone]       India Kuhn PA-C  05/22/21 9148

## 2021-06-20 NOTE — LETTER
Letter by Roberth Edwards MD at      Author: Roberth Edwards MD Service: -- Author Type: --    Filed:  Encounter Date: 2020 Status: (Other)       2020    Dear Dr. Martinez,    See below for documentation of a telephone conversation that I had with Arabella Suero ( 1935). Please feel free to call me at any time if needed.    Sincerely,    Roberth Edwards MD  Pulmonary and Critical Care Medicine  [facility]  Cell [phone]  Office [phone]  Pager [phone]    Pulmonary Telephone Note    Arabella called me back on the [facility] ICU office phone from which I called her today. She sounded tangential, confused, unclear who I was despite our recent conversation. Her subsequent statements were difficult to interpret, and I suspect she has underlying dementia. She states that she does not want to go to the [facility] or [facility] for testing, and that she will only go to [place], then says that she does not want to drive to [place]. I attempted to redirect her, ask about her dyspnea and discuss a way to get pulmonary function testing done, but did not get anywhere with the conversation. She denies exertional dyspnea, saying it only happens when she talks. At this point, will cancel attempts to schedule pulmonary function testing and have her call us if she would like to proceed.

## 2021-06-20 NOTE — LETTER
"Letter by Roberth Edwards MD at      Author: Roberth Edwards MD Service: -- Author Type: --    Filed:  Encounter Date: 7/10/2020 Status: (Other)       10 July 2020    Dear Dr. Martinez,    See below for documentation of a virtual visit that I had with Arabella Suero ( 1935). Please feel free to call me at any time if needed.    Sincerely,    Roberth Edwards MD  Pulmonary and Critical Care Medicine  [facility]  Cell [phone]  Office [phone]  Pager [phone]    Arabella Suero is a 84 y.o. female who is being evaluated via a billable telephone visit.      The patient has been notified of following:     \"This telephone visit will be conducted via a call between you and your physician/provider. We have found that certain health care needs can be provided without the need for a physical exam.  This service lets us provide the care you need with a short phone conversation.  If a prescription is necessary we can send it directly to your pharmacy.  If lab work is needed we can place an order for that and you can then stop by our lab to have the test done at a later time.    Telephone visits are billed at different rates depending on your insurance coverage. During this emergency period, for some insurers they may be billed the same as an in-person visit.  Please reach out to your insurance provider with any questions.    If during the course of the call the physician/provider feels a telephone visit is not appropriate, you will not be charged for this service.\"    Patient has given verbal consent to a Telephone visit? Yes    What phone number would you like to be contacted at? [phone]    Patient would like to receive their AVS by AVS Preference: Mail a copy.    Phone call duration: 30 minutes    Tiffany Momin CMA    Pulmonary Clinic Outpatient Consultation    Assessment and Plan:   84 year old female never smoker with a history of extensive occupational second-hand smoke exposure, " radiographic emphysema, HTN, dyslipidemia, GERD, left carpal tunnel surgery, stroke, extensive CAD on CT coronary calcium scoring, vitamin D deficiency, osteoarthritis, presenting for evaluation of dyspnea.    Dyspnea: History is fairly tangential, though what I am able to determine is that the patient has dyspnea when talking, but has no concerning exertional dyspnea. 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 She may have had PFTs at [facility] in 2016, but I cannot locate the test results in the record. She recently took fluticasone furoate-umeclidinium-vilanterol for 4 days but developed urinary frequency and constipation, so went back to tiotropium handihaler. She also is high risk for symptomatic CAD based on CT coronary calcium scoring, and I do not see that she has had a TTE done; she does deny orthopnea, PND, and leg swelling. Overall, her symptoms are not especially concerning for advanced cardiopulmonary disease, though without basic testing this is difficult to determine. I recommended that we at least obtain pulmonary function testing and a TTE, and she is in agreement.    Plan:  - PFT at [facility]  - TTE  - patient has discontinued fluticasone furoate-umeclidinium-vilanterol due to side effects as noted above  - continue tiotropium handihaler; inhale the contents of one 18-mcg capsule daily  - continue cetirizine 10 mg daily  - continue nasal fluticasone one spray in each nostril daily  - follow-up depending on above test results    I appreciate the opportunity to participate in the care of Ms. Suero. Please feel free to contact me at any time.    Roberth Edwards MD  [facility]  Cell [phone]  Office [phone]  Pager [phone]    CCx: dyspnea, emphysema    HPI: 84 year old female never smoker with a history  of extensive occupational second-hand smoke exposure, radiographic emphysema, HTN, dyslipidemia, GERD, left carpal tunnel surgery, stroke, extensive CAD on CT coronary calcium scoring, vitamin D deficiency, osteoarthritis, presenting for evaluation of dyspnea. Tangential history. 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 Has had dyspnea for about 8 months. Previously was not short of breath until the last 8 months. Has bad summer allergies. Walks a lot but the heat makes it difficult. No chest pain or pressure. No cough. Has a lot of sinus congestion. Has nasal and conjunctival pruritus, has postnasal drip. Says she uses nasal fluticasone. Taking Zyrtec once daily. No leg swelling. Has dyspnea when talking but not with activity. Normal Hgb and TSH. Feels like she cannot get a deep breath if talking. States she sleeps a lot. Urinating a lot and constipated with Trelegy after using 3-4 days. Spiriva was tolerated better so went back to Spiriva.    ROS:  A 12-system review was obtained and was negative with the exception of the symptoms endorsed in the history of present illness.    PMH:  Extensive occupational second-hand smoke exposure  Radiographic emphysema  HTN  Dyslipidemia  GERD  Left carpal tunnel surgery  Stroke  Extensive CAD on CT coronary calcium scoring  Vitamin D deficiency  Osteoarthritis    PSH:  Past Surgical History:   Procedure Laterality Date   ? ABDOMINAL SURGERY     ? BREAST SURGERY     ? carpel tunnel -left Left    ? HYSTERECTOMY         Allergies:  Allergies   Allergen Reactions   ? Other Drug Allergy (See Comments)      Antidepressants- drowsiness or hyper       Family HX:  Family History   Problem Relation Age of Onset   ? Congenital heart disease Father        Social Hx:  Social History     Socioeconomic History   ? Marital status:      Spouse name: Not on file   ? Number of children: Not on file    ? Years of education: Not on file   ? Highest education level: Not on file   Occupational History   ? Not on file   Social Needs   ? Financial resource strain: Not on file   ? Food insecurity     Worry: Not on file     Inability: Not on file   ? Transportation needs     Medical: Not on file     Non-medical: Not on file   Tobacco Use   ? Smoking status: Former Smoker     Last attempt to quit: 1977     Years since quittin.9   ? Smokeless tobacco: Never Used   Substance and Sexual Activity   ? Alcohol use: No   ? Drug use: No   ? Sexual activity: Not on file   Lifestyle   ? Physical activity     Days per week: Not on file     Minutes per session: Not on file   ? Stress: Not on file   Relationships   ? Social connections     Talks on phone: Not on file     Gets together: Not on file     Attends Taoist service: Not on file     Active member of club or organization: Not on file     Attends meetings of clubs or organizations: Not on file     Relationship status: Not on file   ? Intimate partner violence     Fear of current or ex partner: Not on file     Emotionally abused: Not on file     Physically abused: Not on file     Forced sexual activity: Not on file   Other Topics Concern   ? Not on file   Social History Narrative   ? Not on file       Current Meds:  Current Outpatient Medications   Medication Sig Dispense Refill   ? albuterol (PROVENTIL) 2.5 mg /3 mL (0.083 %) nebulizer solution Take 2.5 mg by nebulization every 6 (six) hours as needed.   2   ? ALPRAZolam (XANAX XR) 1 MG 24 hr tablet TAKE 1 TABLET BY MOUTH AT NOON AND AT BEDTIME 60 tablet 0   ? ALPRAZolam (XANAX) 0.5 MG tablet TAKE 1 TABLET BY MOUTH ONCE DAILY AS NEEDED FOR ANXIETY 30 tablet 0   ? amLODIPine (NORVASC) 5 MG tablet Take 1 tablet (5 mg total) by mouth at bedtime. 90 tablet 3   ? aspirin 81 MG EC tablet Take 81 mg by mouth daily.     ? atenoloL (TENORMIN) 25 MG tablet Take 1 tablet (25 mg total) by mouth daily. 90 tablet 3   ?  cholecalciferol, vitamin D3, (VITAMIN D3) 2,000 unit cap Take 2,000 Units by mouth daily.     ? hydroCHLOROthiazide (HYDRODIURIL) 12.5 MG tablet Take 1 tablet (12.5 mg total) by mouth daily. 90 tablet 3   ? losartan (COZAAR) 100 MG tablet Take 1 tablet (100 mg total) by mouth daily. 90 tablet 2   ? MICROCHAMBER Spcr FOR HOME USE WITH INHALER  0   ? multivitamin capsule Take 1 capsule by mouth daily.     ? pantoprazole (PROTONIX) 40 MG tablet Take 40 mg by mouth daily as needed.      ? potassium chloride (K-DUR,KLOR-CON) 20 MEQ tablet Take 1 tablet (20 mEq total) by mouth daily. 30 tablet 3   ? rosuvastatin (CRESTOR) 5 MG tablet Take 1 tablet (5 mg total) by mouth at bedtime. 90 tablet 3   ? vitamin E 400 UNIT capsule Take 400 Units by mouth daily.     ? fluticasone-umeclidinium-vilanterol (TRELEGY ELLIPTA) 100-62.5-25 mcg DsDv inhaler Inhale 1 Inhalation daily. 1 each 3   ? SPIRIVA WITH HANDIHALER 18 mcg inhalation capsule ONE CAPSULE (2 PUFF TOTAL) INTO INHALER AND INHALE DAILY     ? VENTOLIN HFA 90 mcg/actuation inhaler Inhale 2 puffs every 4 (four) hours as needed. 1 Inhaler 4     No current facility-administered medications for this visit.        Physical Exam:  no exam due to virtual visit    Labs:  reviewed    Imaging studies:  CXR (January 2020):  - images directly reviewed, formal interpretation follows:  IMPRESSION:   The lungs are well-inflated. No acute infiltrate or pleural effusion. Normal heart size. No pulmonary vascular congestion. Calcified granulomas are seen in both pulmonary antwon. Prominent vessels are noted on the left but the contours appear   stable on the lateral view. Mild degenerative change in the spine. Stable mild wedge deformities in the mid thoracic segment. Mild scoliosis.    Chest CTA (December 2019):  - images directly reviewed, formal interpretation follows:  FINDINGS:  ANGIOGRAM CHEST: Pulmonary arteries are normal caliber and negative for pulmonary emboli. Thoracic aorta is  negative for dissection. No CT evidence of right heart strain.     LUNGS AND PLEURA: Moderate emphysema. Benign calcified granuloma right upper lobe. Lungs otherwise clear.     MEDIASTINUM/AXILLAE: Normal.     UPPER ABDOMEN: Normal.     MUSCULOSKELETAL: Stable minimal vertebral body height loss from T7 to T11 as seen on plain film. No compression fractures at T5 or T6 as suggested by plain film. Much better seen on CT.     IMPRESSION:   1.  Negative for pulmonary emboli and dissections.     2.  Moderate emphysema.     3.  No convincing evidence for any acute compression fractures in the thoracic spine.    CT coronary calcium scoring (July 2017):  - The total Agatston calcium score is 460. A calcium score in this range places the individual in the 100th percentile when compared to an age and gender matched control group and implies a very high risk of cardiac events in the next ten years.  - Technically difficult study. Study is limited secondary to significant motion artifact. The midportion of the left anterior descending is not optimally visualized.  - Findings suggest potentially significant stenosis in the early midportion of the right coronary artery with soft plaque and diffuse calcified plaque. Estimated luminal stenosis of 60-70%.  - Diffuse disease suggested in the proximal and mid left anterior descending. Moderate diffuse disease suggested although the LAD is suboptimally visualized in its midsection. Estimated luminal stenosis of 50-60%.

## 2021-06-20 NOTE — LETTER
Letter by Rain Martinez MD at      Author: Rain Martinez MD Service: -- Author Type: --    Filed:  Encounter Date: 5/22/2020 Status: (Other)         Arabella Suero  [address]  [place] [address]             May 22, 2020         Dear Ms. Suero,    Below are the results from your recent visit:    Resulted Orders   Comprehensive Metabolic Panel   Result Value Ref Range    Sodium 133 (L) 136 - 145 mmol/L    Potassium 3.7 3.5 - 5.0 mmol/L    Chloride 99 98 - 107 mmol/L    CO2 23 22 - 31 mmol/L    Anion Gap, Calculation 11 5 - 18 mmol/L    Glucose 86 70 - 125 mg/dL    BUN 10 8 - 28 mg/dL    Creatinine 0.71 0.60 - 1.10 mg/dL    GFR MDRD Af Amer >60 >60 mL/min/1.73m2    GFR MDRD Non Af Amer >60 >60 mL/min/1.73m2    Bilirubin, Total 0.6 0.0 - 1.0 mg/dL    Calcium 8.6 8.5 - 10.5 mg/dL    Protein, Total 6.7 6.0 - 8.0 g/dL    Albumin 4.2 3.5 - 5.0 g/dL    Alkaline Phosphatase 61 45 - 120 U/L    AST 30 0 - 40 U/L    ALT 21 0 - 45 U/L    Narrative    Fasting Glucose reference range is 70-99 mg/dL per  American Diabetes Association (ADA) guidelines.   HM2(CBC w/o Differential)   Result Value Ref Range    WBC 5.5 4.0 - 11.0 thou/uL    RBC 4.69 3.80 - 5.40 mill/uL    Hemoglobin 14.4 12.0 - 16.0 g/dL    Hematocrit 41.2 35.0 - 47.0 %    MCV 88 80 - 100 fL    MCH 30.8 27.0 - 34.0 pg    MCHC 35.0 32.0 - 36.0 g/dL    RDW 12.6 11.0 - 14.5 %    Platelets 196 140 - 440 thou/uL    MPV 6.8 (L) 7.0 - 10.0 fL   Thyroid Cascade   Result Value Ref Range    TSH 1.47 0.30 - 5.00 uIU/mL       Great to see you in clinic!  Kidneys, electrolytes, blood sugar and liver function are normal.  Blood levels are normal as it thyroid.    Please call with questions or contact us using "[employer].    Sincerely,        Electronically signed by Rain Martinez MD

## 2021-06-20 NOTE — LETTER
Letter by Roberth Edwards MD at      Author: Roberth Edwards MD Service: -- Author Type: --    Filed:  Encounter Date: 2020 Status: (Other)       2020    Dear Dr. Martinez,    See below for documentation of a telephone conversation that I had with Arabella Suero ( 1935). Please feel free to call me at any time if needed.    Sincerely,    Roberth Edwards MD  Pulmonary and Critical Care Medicine  [facility]  Cell [phone]  Office [phone]  Pager [phone]    Pulmonary Telephone Note    TTE unremarkable. E/e' 8-15, equivocal for LV filling pressures. Has not scheduled pulmonary function testing yet, but also her phone number in the chart was incorrect. Called her  and he had me speak with the patient and I updated her phone number. Will have the lung clinic call her to schedule PFTs and I will contact her when I have results. She is in agreement.

## 2021-06-20 NOTE — LETTER
Letter by Rain Martinez MD at      Author: Rain Martinez MD Service: -- Author Type: --    Filed:  Encounter Date: 1/16/2020 Status: Signed         Arabella Suero  [address]  [place] [address]             January 16, 2020         Dear Ms. Suero,    Below are the results from your recent visit:    Resulted Orders   Lipid [place] FASTING   Result Value Ref Range    Cholesterol 151 <=199 mg/dL    Triglycerides 190 (H) <=149 mg/dL    HDL Cholesterol 49 (L) >=50 mg/dL    LDL Calculated 64 <=129 mg/dL    Patient Fasting > 8hrs? Yes    Comprehensive Metabolic Panel   Result Value Ref Range    Sodium 138 136 - 145 mmol/L    Potassium 4.0 3.5 - 5.0 mmol/L    Chloride 100 98 - 107 mmol/L    CO2 28 22 - 31 mmol/L    Anion Gap, Calculation 10 5 - 18 mmol/L    Glucose 91 70 - 125 mg/dL    BUN 14 8 - 28 mg/dL    Creatinine 0.72 0.60 - 1.10 mg/dL    GFR MDRD Af Amer >60 >60 mL/min/1.73m2    GFR MDRD Non Af Amer >60 >60 mL/min/1.73m2    Bilirubin, Total 0.8 0.0 - 1.0 mg/dL    Calcium 9.6 8.5 - 10.5 mg/dL    Protein, Total 7.1 6.0 - 8.0 g/dL    Albumin 4.4 3.5 - 5.0 g/dL    Alkaline Phosphatase 67 45 - 120 U/L    AST 23 0 - 40 U/L    ALT 14 0 - 45 U/L    Narrative    Fasting Glucose reference range is 70-99 mg/dL per  American Diabetes Association (ADA) guidelines.   Hemoglobin   Result Value Ref Range    Hemoglobin 15.1 12.0 - 16.0 g/dL       Hemoglobin was normal.  Kidneys, electrolytes, blood sugar and liver function are normal.  Cholesterol is stable.  Chest xray looked good overall - nothing concerning    Please call with questions or contact us using [employer].    Sincerely,        Electronically signed by Rain Martinez MD

## 2021-06-25 NOTE — PROGRESS NOTES
Progress Notes by Michael Ospina MD at 11/2/2017 11:30 AM     Author: Michael Ospina MD Service: -- Author Type: Physician    Filed: 11/2/2017  2:18 PM Encounter Date: 11/2/2017 Status: Signed    : Michael Ospina MD (Physician)           Click to link to [facility] HEART [facility] NOTE       Assessment/Plan:   1.  Coronary artery disease: Her coronary CT angiogram was reported mild to moderate stenosis, significant calcified lesions.  Nonobstructive.  The patient's symptoms are very nonspecific.  She has no exertional chest discomfort, no dyspnea on exertion.  Continue aspirin 81 mg daily, atenolol 25 mg daily, amlodipine 5 mg daily.    2.  Essential hypertension: Her blood pressure has been controlled well with current medications including atenolol 25 mg daily, amlodipine 5 mg daily, hydrochlorothiazide 12.5 mg daily.    3.  Hypercholesterolemia: Her last LDL was 186.  We have spent a lot of time to discuss her coronary CT angiogram report and also management of hypercholesterolemia.  The patient has significant  calcified plaques in different coronary arteries.  It is important to control LDL.  The patient had a medical history of statins induced elevation of total CK level.  We discussed low dose of Crestor versus PCSK9 inhibitors.  Consider the cost, start Crestor 5 mg QHS, and then recheck lipid profile, total CK level and liver function in 2 months.  If the patient could intolerant Crestor, and then consider to use PCSK9 inhibitor.  We will have Gene test for homozygote Familial hypercholesterolemia.    Total 40 minutes were spent in this visit for face to face visit, physical exam, review of current labs/imaging studies, plan for ongoing treatment.    Thank you for the opportunity to be involved in the care of Arabella Suero. If you have any questions, please feel free to contact me.  I will see the patient again in 3 months.    Much or all of the text in this note was generated through the  use of Dragon Dictate voice-to-text software. Errors in spelling or words which seem out of context are unintentional.   Sound alike errors, in particular, may have escaped editing.       History of Present Illness:   It is my pleasure to see Arabella Suero at the [facility] clinic for evaluation of Follow-up and discussing coronary CT angiogram findings.  Arabella Suero is a 82 y.o. female with a medical history of essential hypertension, hypercholesterolemia, history of CVA, coronary artery disease.    The patient states that she has no chest pain.  She does have mild dyspnea on exertion and fatigue.  The patient has chronic insomnia.  The patient has occasional irregular heartbeats.  She complains of skin rash.  She has occasional diarrhea.  She has no orthopnea PND or leg swelling.    The patient had a coronary CT angiogram on July 17, 2017 for evaluation of atypical chest pain.  Her coronary CT angiogram was reported total calcium score of 460, mild to moderate to stenosis in 3 different coronary arteries.  There was no obstructive disease.      Past Medical History:     Patient Active Problem List   Diagnosis   ? Essential hypertension   ? Hypercholesterolemia   ? Cerebrovascular accident (CVA) due to embolism   ? Hypokalemia   ? Hyponatremia   ? Diarrhea due to drug   ? Coronary artery disease due to calcified coronary lesion       Past Surgical History:     Past Surgical History:   Procedure Laterality Date   ? ABDOMINAL SURGERY     ? BREAST SURGERY     ? carpel tunnel -left Left    ? HYSTERECTOMY         Family History:     Family History   Problem Relation Age of Onset   ? Congenital heart disease Father         Social History:    reports that she quit smoking about 40 years ago. She has never used smokeless tobacco. She reports that she does not drink alcohol or use illicit drugs.    Review of Systems:   General: WNL  Eyes: WNL  Ears/Nose/Throat: WNL  Lungs: Cough, Shortness of Breath,  "Snoring  Heart: Arm Pain, Shortness of Breath with activity  Stomach: Constipation, Diarrhea, Heartburn, Nausea  Bladder: WNL  Muscle/Joints: WNL  Skin: Rash  Nervous System: Daytime Sleepiness  Mental Health: Anxiety     Blood: WNL    Meds:     Current Outpatient Prescriptions:   ?  ALPRAZolam (XANAX) 0.5 MG tablet, Take 0.5 mg by mouth daily., Disp: , Rfl:   ?  ALPRAZolam (XANAX) 1 MG tablet, Take 1 mg by mouth 2 (two) times a day., Disp: , Rfl:   ?  amLODIPine (NORVASC) 5 MG tablet, Take 5 mg by mouth daily., Disp: , Rfl:   ?  aspirin 81 MG EC tablet, Take 81 mg by mouth daily., Disp: , Rfl:   ?  atenolol (TENORMIN) 25 MG tablet, Take 25 mg by mouth daily., Disp: , Rfl:   ?  cholecalciferol, vitamin D3, (VITAMIN D3) 2,000 unit cap, Take 2,000 Units by mouth daily., Disp: , Rfl:   ?  fluticasone (FLONASE) 50 mcg/actuation nasal spray, 1 spray into each nostril as needed for rhinitis., Disp: , Rfl:   ?  hydroCHLOROthiazide (HYDRODIURIL) 12.5 MG tablet, Take 12.5 mg by mouth daily., Disp: , Rfl:   ?  losartan (COZAAR) 100 MG tablet, Take 100 mg by mouth daily., Disp: , Rfl:   ?  multivitamin capsule, Take 1 capsule by mouth daily., Disp: , Rfl:   ?  potassium chloride SA (K-DUR,KLOR-CON) 20 MEQ tablet, Take 20 mEq by mouth daily., Disp: , Rfl:   ?  vitamin E 400 UNIT capsule, Take 400 Units by mouth daily., Disp: , Rfl:   ?  rosuvastatin (CRESTOR) 5 MG tablet, Take 1 tablet (5 mg total) by mouth at bedtime., Disp: 30 tablet, Rfl: 11    Allergies:   Other drug allergy (see comments)      Objective:      Physical Exam  114 lb 3.2 oz (51.8 kg)  4' 11\" (1.499 m)  Body mass index is 23.07 kg/(m^2).  /70 (Patient Site: Left Arm, Patient Position: Sitting, Cuff Size: Adult Regular)  Pulse 76  Resp 20  Ht 4' 11\" (1.499 m)  Wt 114 lb 3.2 oz (51.8 kg)  BMI 23.07 kg/m2    General Appearance:   Awake, Alert, No acute distress.   HEENT:  Pupil equal and reactive to light. No scleral icterus; the mucous membranes were " moist.   Neck: No cervical bruits. No JVD. No thyromegaly.     Chest: The spine was straight. The chest was symmetric.   Lungs:   Respirations unlabored; Lungs are clear to auscultation. No crackles. No wheezing.   Cardiovascular:   Regular rhythm and rate, normal first and second heart sounds with no murmurs. No rubs or gallops.    Abdomen:  Soft. No tenderness. Non-distended. Bowels sounds are present   Extremities: Equal tibial pulses. No leg edema.   Skin: No rashes or ulcers. Warm, Dry.   Musculoskeletal: No tenderness. No deformity.   Neurologic: Mood and affect are appropriate. No focal deficits.         EKG: Personally reviewed  Sinus tachycardia   Abnormal ECG     Cardiac Imaging Studies  Coronary CTA on 7-:    The total Agatston calcium score is 460. A calcium score in this range places the individual in the 100th percentile when compared to an age and gender matched control group and implies a very high risk of cardiac events in the next ten years.    Technically difficult study. Study is limited secondary to significant motion artifact. The midportion of the left anterior descending is not optimally visualized.    Findings suggest potentially significant stenosis in the early midportion of the right coronary artery with soft plaque and diffuse calcified plaque. Estimated luminal stenosis of 60-70%.    Diffuse disease suggested in the proximal and mid left anterior descending. Moderate diffuse disease suggested although the LAD is suboptimally visualized in its midsection. Estimated luminal stenosis of 50-60%.    Please see radiology interpretation for additional findings.    Lab Review   Lab Results   Component Value Date     (L) 08/21/2017    K 4.4 08/21/2017    CL 93 (L) 08/21/2017    CO2 29 08/21/2017    BUN 8 08/21/2017    CREATININE 0.74 08/21/2017    CALCIUM 9.3 08/21/2017     Lab Results   Component Value Date    WBC 10.9 04/19/2017    HGB 14.5 04/19/2017    HCT 41.4 04/19/2017    MCV  83 04/19/2017     04/19/2017     Lab Results   Component Value Date    CHOL 277 (H) 09/29/2016    CHOL 254 (H) 04/13/2016    CHOL 243 (H) 07/01/2015     Lab Results   Component Value Date    HDL 64 09/29/2016    HDL 48 (L) 04/13/2016    HDL 49 07/01/2015     Lab Results   Component Value Date    LDLCALC 187 (H) 09/29/2016    LDLCALC 187 (H) 04/13/2016    LDLCALC 169 (H) 07/01/2015     Lab Results   Component Value Date    TRIG 130 09/29/2016    TRIG 94 04/13/2016    TRIG 127 07/01/2015

## 2021-06-25 NOTE — PROGRESS NOTES
Progress Notes by Supa Garcia MD (Ted) at 8/9/2017  9:10 AM     Author: Supa Garcia MD (Ted) Service: -- Author Type: Physician    Filed: 8/9/2017 10:18 AM Encounter Date: 8/9/2017 Status: Signed    : Spua Garcia MD (Ted) (Physician)           Click to link to [facility] HEART [facility] NOTE    Thank you, Dr. Maza, for asking the [facility] to evaluate Ms. Arabella Suero.      Assessment/Recommendations   Assessment:    Abnormal CT coronary angiogram consistent with coronary atherosclerosis, uncertain severity-probably mild to moderate  Hypertension, controlled  Hyperlipidemia of statin due to CK elevation  Abdominal aortic aneurysm small    Plan:  The results of the CT coronary angiogram indicate presence of coronary artery disease.  She does not appear to be have any ischemic symptoms at present time.  Occasionally I have second reader to confirm severity of coronary obstruction in borderline cases.  I will do this in her case.  If she does have severe coronary artery disease we may want to proceed with invasive coronary geography.  Otherwise will continue lifestyle modification.  Unfortunately she is unable to take statins due to CK elevation.       History of Present Illness    Ms. Arabella Suero is a 81 y.o. female who comes in today for review of coronary CTA.  She was seen by my partner Dr. Ospina in October of last year because of abnormal EKG during carpal tunnel surgery.  He recommended CT coronary angiogram.  The patient was unwilling to undergo stress test.  The patient did not undergo CT angiogram until July of this year.  She reports no episode of chest pain or shortness of breath.  She is fairly physically active.  When the CT results became available she wanted to review it with me as I have seen her other family members.  Today she has no reports of chest pain, shortness of breath, PND, orthopnea, heart palpitation,  syncope. She is fairly physically active.  She has a long-standing history of hypertension dating back 40 years.  She states the blood pressure stays under good control.  EK normal sinus rhythm within normal limit    CT coronary Angiogram:   The total Agatston calcium score is 460. A calcium score in this range places the individual in the 100th percentile when compared to an age and gender matched control group and implies a very high risk of cardiac events in the next ten years.    Technically difficult study. Study is limited secondary to significant motion artifact. The midportion of the left anterior descending is not optimally visualized.    Findings suggest potentially significant stenosis in the early midportion of the right coronary artery with soft plaque and diffuse calcified plaque. Estimated luminal stenosis of 60-70%.  Diffuse disease suggested in the proximal and mid left anterior descending. Moderate diffuse disease suggested although the LAD is suboptimally visualized in its midsection. Estimated luminal stenosis of 50-60%.     Physical Examination Review of Systems   Vitals:    17 0911   BP: 140/82   Pulse: 60   Resp: 16   SpO2: 99%     Body mass index is 23.03 kg/(m^2).  Wt Readings from Last 3 Encounters:   17 114 lb (51.7 kg)   17 109 lb (49.4 kg)   17 115 lb 4.8 oz (52.3 kg)     General Appearance:   Alert, cooperative, no distress, appears stated age   Head/ENT: Normocephalic, without obvious abnormality. Membranes moist      EYES:  no scleral icterus, normal conjunctivae   Neck: Supple, symmetrical, trachea midline, no adenopathy, thyroid: not enlarged, symmetric, no carotid bruit or JVD   Chest/Lungs:   Lungs are clear to auscultation, respirations unlabored. No tenderness or deformity    Cardiovascular:   Regular rhythm, S1, S2 normal, no murmur, rub or gallop.   Abdomen:  Soft, non-tender, bowel sounds active all four quadrants,  no masses, no organomegaly    Extremities: no cyanosis or clubbing. No edema   Skin: Skin color, texture, turgor normal, no rashes or lesions.    Psychiatric: Normal affect, calm   Neurologic: Alert and oriented x 3, moving all four extremities.     General: WNL  Eyes: WNL  Ears/Nose/Throat: WNL  Lungs: WNL  Heart: WNL  Stomach: WNL  Bladder: WNL  Muscle/Joints: WNL  Skin: WNL  Nervous System: WNL  Mental Health: Anxiety     Blood: Easy Bleeding     Medical History  Surgical History Family History Social History   Past Medical History:   Diagnosis Date   ? Cancer    ? GERD (gastroesophageal reflux disease)    ? Hyperlipidemia    ? Hypertension     Past Surgical History:   Procedure Laterality Date   ? ABDOMINAL SURGERY     ? BREAST SURGERY     ? carpel tunnel -left Left    ? HYSTERECTOMY      Family History   Problem Relation Age of Onset   ? Congenital heart disease Father     Social History     Social History   ? Marital status:      Spouse name: N/A   ? Number of children: N/A   ? Years of education: N/A     Occupational History   ? Not on file.     Social History Main Topics   ? Smoking status: Former Smoker     Quit date: 7/27/1977   ? Smokeless tobacco: Not on file   ? Alcohol use No   ? Drug use: No   ? Sexual activity: Not on file     Other Topics Concern   ? Not on file     Social History Narrative          Medications  Allergies   Current Outpatient Prescriptions   Medication Sig Dispense Refill   ? ALPRAZolam (XANAX) 1 MG tablet Take 1 mg by mouth 2 (two) times a day.     ? amLODIPine (NORVASC) 5 MG tablet Take 5 mg by mouth daily.     ? atenolol (TENORMIN) 25 MG tablet Take 25 mg by mouth daily.     ? cholecalciferol, vitamin D3, (VITAMIN D3) 2,000 unit cap Take 2,000 Units by mouth daily.     ? fluticasone (FLONASE) 50 mcg/actuation nasal spray 1 spray into each nostril as needed for rhinitis.     ? hydroCHLOROthiazide (HYDRODIURIL) 12.5 MG tablet Take 12.5 mg by mouth daily.     ? ipratropium (ATROVENT) 0.03 % nasal spray  2 sprays into each nostril 3 (three) times a day.     ? losartan (COZAAR) 100 MG tablet Take 100 mg by mouth daily.     ? multivitamin capsule Take 1 capsule by mouth daily.     ? potassium chloride SA (K-DUR,KLOR-CON) 20 MEQ tablet Take 20 mEq by mouth daily.     ? vitamin E 400 UNIT capsule Take 400 Units by mouth daily.     ? albuterol (PROVENTIL HFA;VENTOLIN HFA) 90 mcg/actuation inhaler Inhale 2 puffs as needed.     ? ALPRAZolam (XANAX) 0.5 MG tablet Take 0.5 mg by mouth daily.     ? DOCOSAHEXANOIC ACID/EPA (FISH OIL ORAL) Take 1 capsule by mouth 2 (two) times a day.     ? pantoprazole (PROTONIX) 40 MG tablet Take 40 mg by mouth daily.       No current facility-administered medications for this visit.       Allergies   Allergen Reactions   ? Other Drug Allergy (See Comments)      Antidepressants- drowsiness or hyper         Lab Results    Chemistry/lipid CBC Cardiac Enzymes/BNP/TSH/INR   Lab Results   Component Value Date    CHOL 277 (H) 09/29/2016    HDL 64 09/29/2016    LDLCALC 187 (H) 09/29/2016    TRIG 130 09/29/2016    CREATININE 0.79 04/20/2017    BUN 11 04/20/2017    K 4.7 04/20/2017     04/20/2017     04/20/2017    CO2 25 04/20/2017    Lab Results   Component Value Date    WBC 10.9 04/19/2017    HGB 14.5 04/19/2017    HCT 41.4 04/19/2017    MCV 83 04/19/2017     04/19/2017    Lab Results   Component Value Date    CKTOTAL 206 (H) 07/15/2015    TSH 1.75 11/25/2016

## 2021-06-26 NOTE — PROGRESS NOTES
Progress Notes by Mihcael Ospina MD at 6/20/2018  1:10 PM     Author: Michael Ospina MD Service: -- Author Type: Physician    Filed: 6/20/2018  1:51 PM Encounter Date: 6/20/2018 Status: Signed    : Michael Ospina MD (Physician)           Click to link to [facility] HEART [facility] NOTE       Assessment/Plan:   1.  Coronary artery disease: Her coronary CT angiogram was reported mild to moderate stenosis, significant calcified lesions.  Nonobstructive.  She has no shortness of breath and chest pain.  Continue aspirin 81 mg daily, atenolol 25 mg daily, amlodipine 5 mg daily.    Regarding her left arm pain, it is less likely cardiac etiology.  When she had left arm pain, her ECG was normal, troponin was normal on May 18.    The patient declined to repeat ECG today.  The patient coronary calcium score was 460, mild to moderate stenosis in 3 negative coronary arteries.  The patient declined to have nuclear stress test for ischemic evaluation.  Patient states that she will try ibuprofen as recommended by primary care physician.  She will let me know if she developed the chest pain.    2.  Essential hypertension: Her blood pressure has been controlled well with current medications including atenolol 25 mg daily, amlodipine 5 mg daily, hydrochlorothiazide 12.5 mg daily.    3.  Hypercholesterolemia: Her last LDL was 186, dropped to 78 after starting Crestor 5 mg daily.  Her HDL 67, total cholesterol 161, triglyceride 80.  Her cholesterol has been significantly improved with Crestor 5 mg daily.  Continue these medications.  Her liver function and CK level were in normal range.    .    Thank you for the opportunity to be involved in the care of Arabella Suero. If you have any questions, please feel free to contact me.  I will see the patient again in 6 months.    Much or all of the text in this note was generated through the use of Dragon Dictate voice-to-text software. Errors in spelling or words which seem out  of context are unintentional.   Sound alike errors, in particular, may have escaped editing.       History of Present Illness:   It is my pleasure to see Arabella Suero at the [facility] clinic for evaluation of left arm pain.  Arabella Suero is a 82 y.o. female with a medical history of essential hypertension, hypercholesterolemia, history of CVA, coronary artery disease.    The patient states that he has almost constant left arm pain over last 2 months.  She went to emergency room at [facility] due to similar complaints.  Her EKG showed normal sinus rhythm.  Her troponin was normal.  Her left arm pain has been constant, 8/10 in intensity, improved with ice bag, not associated with exertion or shortness of breath.     She has no chest pain.  She does have mild dyspnea on exertion and fatigue which has been stable.  She has no shortness of breath.  She has no palpitations, dizziness, orthopnea, PND or leg edema.  She complains of diarrhea.      The patient had a coronary CT angiogram on July 17, 2017 for evaluation of atypical chest pain.  Her coronary CT angiogram was reported total calcium score of 460, mild to moderate to stenosis in 3 different coronary arteries.  There was no obstructive disease.    Past Medical History:     Patient Active Problem List   Diagnosis   ? Essential hypertension   ? Hypercholesterolemia   ? Cerebrovascular accident (CVA) due to embolism (H)   ? Hypokalemia   ? Hyponatremia   ? Diarrhea due to drug   ? Coronary artery disease due to calcified coronary lesion       Past Surgical History:     Past Surgical History:   Procedure Laterality Date   ? ABDOMINAL SURGERY     ? BREAST SURGERY     ? carpel tunnel -left Left    ? HYSTERECTOMY         Family History:     Family History   Problem Relation Age of Onset   ? Congenital heart disease Father         Social History:    reports that she quit smoking about 40 years ago. She has never used smokeless tobacco. She reports that  "she does not drink alcohol or use illicit drugs.    Review of Systems:   General: Night Sweats, Weight Gain  Eyes: WNL  Ears/Nose/Throat: WNL  Lungs: WNL  Heart: Arm Pain  Stomach: Nausea  Bladder: WNL  Muscle/Joints: Joint Pain, Muscle Weakness, Muscle Pain  Skin: Rash  Nervous System: WNL  Mental Health: Anxiety     Blood: WNL    Meds:     Current Outpatient Prescriptions:   ?  ALPRAZolam (XANAX XR) 1 MG 24 hr tablet, Take 1 mg by mouth daily., Disp: , Rfl:   ?  aspirin 81 MG EC tablet, Take 81 mg by mouth daily., Disp: , Rfl:   ?  atenolol (TENORMIN) 25 MG tablet, Take 25 mg by mouth daily., Disp: , Rfl:   ?  cholecalciferol, vitamin D3, (VITAMIN D3) 2,000 unit cap, Take 2,000 Units by mouth daily., Disp: , Rfl:   ?  fluticasone (FLONASE) 50 mcg/actuation nasal spray, 1 spray into each nostril as needed for rhinitis., Disp: , Rfl:   ?  hydroCHLOROthiazide (HYDRODIURIL) 12.5 MG tablet, Take 12.5 mg by mouth daily., Disp: , Rfl:   ?  losartan (COZAAR) 100 MG tablet, Take 100 mg by mouth daily., Disp: , Rfl:   ?  multivitamin capsule, Take 1 capsule by mouth daily., Disp: , Rfl:   ?  pantoprazole (PROTONIX) 40 MG tablet, Take 40 mg by mouth daily., Disp: , Rfl:   ?  potassium chloride SA (K-DUR,KLOR-CON) 20 MEQ tablet, Take 20 mEq by mouth daily., Disp: , Rfl:   ?  rosuvastatin (CRESTOR) 5 MG tablet, Take 1 tablet (5 mg total) by mouth at bedtime., Disp: 30 tablet, Rfl: 11  ?  vitamin E 400 UNIT capsule, Take 400 Units by mouth daily., Disp: , Rfl:   ?  ALPRAZolam (XANAX) 0.5 MG tablet, Take 0.5 mg by mouth 4 (four) times a day as needed for anxiety. , Disp: , Rfl:   ?  amLODIPine (NORVASC) 5 MG tablet, Take 5 mg by mouth at bedtime. , Disp: , Rfl:     Allergies:   Other drug allergy (see comments)      Objective:      Physical Exam  117 lb (53.1 kg)  4' 11\" (1.499 m)  Body mass index is 23.63 kg/(m^2).  /68 (Patient Site: Right Arm, Patient Position: Sitting, Cuff Size: Adult Regular)  Pulse 68  Resp 16  " "Ht 4' 11\" (1.499 m)  Wt 117 lb (53.1 kg)  BMI 23.63 kg/m2    General Appearance:   Awake, Alert, No acute distress.   HEENT:  Pupil equal and reactive to light. No scleral icterus; the mucous membranes were moist.   Neck: No cervical bruits. No JVD. No thyromegaly.     Chest: The spine was straight. The chest was symmetric.   Lungs:   Respirations unlabored; Lungs are clear to auscultation. No crackles. No wheezing.   Cardiovascular:   Regular rhythm and rate, normal first and second heart sounds with no murmurs. No rubs or gallops.    Abdomen:  Soft. No tenderness. Non-distended. Bowels sounds are present   Extremities: Equal tibial pulses. No leg edema.   Skin: No rashes or ulcers. Warm, Dry.   Musculoskeletal: No tenderness. No deformity.   Neurologic: Mood and affect are appropriate. No focal deficits.         EKG: Personally reviewed  Sinus rhythm   Normal ECG   When compared with ECG of 18-APR-2017 23:21,   No significant change was found      Cardiac Imaging Studies  Coronary CTA on 7-:    The total Agatston calcium score is 460. A calcium score in this range places the individual in the 100th percentile when compared to an age and gender matched control group and implies a very high risk of cardiac events in the next ten years.    Technically difficult study. Study is limited secondary to significant motion artifact. The midportion of the left anterior descending is not optimally visualized.    Findings suggest potentially significant stenosis in the early midportion of the right coronary artery with soft plaque and diffuse calcified plaque. Estimated luminal stenosis of 60-70%.    Diffuse disease suggested in the proximal and mid left anterior descending. Moderate diffuse disease suggested although the LAD is suboptimally visualized in its midsection. Estimated luminal stenosis of 50-60%.    Please see radiology interpretation for additional findings.    Lab Review   Lab Results   Component Value " Date     (L) 05/21/2018    K 3.8 05/21/2018    CL 96 (L) 05/21/2018    CO2 23 05/21/2018    BUN 10 05/21/2018    CREATININE 0.77 05/21/2018    CALCIUM 9.6 05/21/2018     Lab Results   Component Value Date    WBC 6.7 05/18/2018    HGB 14.6 05/18/2018    HCT 42.2 05/18/2018    MCV 86 05/18/2018     05/18/2018     Lab Results   Component Value Date    CHOL 161 01/02/2018    CHOL 138 11/14/2017    CHOL 277 (H) 09/29/2016     Lab Results   Component Value Date    HDL 67 01/02/2018    HDL 53 11/14/2017    HDL 64 09/29/2016     Lab Results   Component Value Date    LDLCALC 78 01/02/2018    LDLCALC 69 11/14/2017    LDLCALC 187 (H) 09/29/2016     Lab Results   Component Value Date    TRIG 80 01/02/2018    TRIG 80 11/14/2017    TRIG 130 09/29/2016     Lab Results   Component Value Date    TROPONINI <0.01 05/18/2018     Lab Results   Component Value Date    TSH 1.12 05/21/2018

## 2021-06-26 NOTE — PROGRESS NOTES
Progress Notes by Michael Ospina MD at 3/1/2018 11:10 AM     Author: Michael Ospina MD Service: -- Author Type: Physician    Filed: 3/1/2018 11:45 AM Encounter Date: 3/1/2018 Status: Signed    : Michael Ospina MD (Physician)           Click to link to [facility] HEART [facility] NOTE       Assessment/Plan:   1.  Coronary artery disease: Her coronary CT angiogram was reported mild to moderate stenosis, significant calcified lesions.  Nonobstructive.  She has no shortness of breath and chest pain.  Continue aspirin 81 mg daily, atenolol 25 mg daily, amlodipine 5 mg daily.    2.  Essential hypertension: Her blood pressure has been controlled well with current medications including atenolol 25 mg daily, amlodipine 5 mg daily, hydrochlorothiazide 12.5 mg daily.    3.  Hypercholesterolemia: Her last LDL was 186, dropped to 78 after starting Crestor 5 mg daily.  Her HDL 67, total cholesterol 161, triglyceride 80.  Her cholesterol has been significantly improved with Crestor 5 mg daily.  Continue these medications.  Her liver function and CK level were in normal range.    .    Thank you for the opportunity to be involved in the care of Arabella Suero. If you have any questions, please feel free to contact me.  I will see the patient again in 12 months.    Much or all of the text in this note was generated through the use of Dragon Dictate voice-to-text software. Errors in spelling or words which seem out of context are unintentional.   Sound alike errors, in particular, may have escaped editing.       History of Present Illness:   It is my pleasure to see Arabella Suero at the [facility] clinic for evaluation of Follow-up.  Arabella Suero is a 82 y.o. female with a medical history of essential hypertension, hypercholesterolemia, history of CVA, coronary artery disease.    The patient states that she has no chest pain.  She does have mild dyspnea on exertion and fatigue which has been stable.  She has no  shortness of breath.  She has no palpitations, dizziness, orthopnea, PND or leg edema.  She complains of diarrhea.  She has left arm pain, not associated with exertion, has follow-up with orthopedic clinic.    The patient had a coronary CT angiogram on July 17, 2017 for evaluation of atypical chest pain.  Her coronary CT angiogram was reported total calcium score of 460, mild to moderate to stenosis in 3 different coronary arteries.  There was no obstructive disease.    Past Medical History:     Patient Active Problem List   Diagnosis   ? Essential hypertension   ? Hypercholesterolemia   ? Cerebrovascular accident (CVA) due to embolism   ? Hypokalemia   ? Hyponatremia   ? Diarrhea due to drug   ? Coronary artery disease due to calcified coronary lesion       Past Surgical History:     Past Surgical History:   Procedure Laterality Date   ? ABDOMINAL SURGERY     ? BREAST SURGERY     ? carpel tunnel -left Left    ? HYSTERECTOMY         Family History:     Family History   Problem Relation Age of Onset   ? Congenital heart disease Father         Social History:    reports that she quit smoking about 40 years ago. She has never used smokeless tobacco. She reports that she does not drink alcohol or use illicit drugs.    Review of Systems:   General: Night Sweats  Eyes: WNL  Ears/Nose/Throat: WNL  Lungs: Snoring  Heart: Arm Pain  Stomach: Constipation, Diarrhea  Bladder: WNL  Muscle/Joints: WNL     Nervous System: WNL  Mental Health: Anxiety     Blood: WNL    Meds:     Current Outpatient Prescriptions:   ?  ALPRAZolam (XANAX) 0.5 MG tablet, Take 0.5 mg by mouth daily., Disp: , Rfl:   ?  amLODIPine (NORVASC) 5 MG tablet, Take 5 mg by mouth daily., Disp: , Rfl:   ?  aspirin 81 MG EC tablet, Take 81 mg by mouth daily., Disp: , Rfl:   ?  atenolol (TENORMIN) 25 MG tablet, Take 25 mg by mouth daily., Disp: , Rfl:   ?  cholecalciferol, vitamin D3, (VITAMIN D3) 2,000 unit cap, Take 2,000 Units by mouth daily., Disp: , Rfl:   ?   "fluticasone (FLONASE) 50 mcg/actuation nasal spray, 1 spray into each nostril as needed for rhinitis., Disp: , Rfl:   ?  hydroCHLOROthiazide (HYDRODIURIL) 12.5 MG tablet, Take 12.5 mg by mouth daily., Disp: , Rfl:   ?  losartan (COZAAR) 100 MG tablet, Take 100 mg by mouth daily., Disp: , Rfl:   ?  multivitamin capsule, Take 1 capsule by mouth daily., Disp: , Rfl:   ?  pantoprazole (PROTONIX) 40 MG tablet, Take 40 mg by mouth daily., Disp: , Rfl:   ?  potassium chloride SA (K-DUR,KLOR-CON) 20 MEQ tablet, Take 20 mEq by mouth daily., Disp: , Rfl:   ?  rosuvastatin (CRESTOR) 5 MG tablet, Take 1 tablet (5 mg total) by mouth at bedtime., Disp: 30 tablet, Rfl: 11  ?  vitamin E 400 UNIT capsule, Take 400 Units by mouth daily., Disp: , Rfl:   ?  ALPRAZolam (XANAX) 1 MG tablet, Take 1 mg by mouth 2 (two) times a day., Disp: , Rfl:     Allergies:   Other drug allergy (see comments)      Objective:      Physical Exam  117 lb (53.1 kg)  4' 11\" (1.499 m)  Body mass index is 23.63 kg/(m^2).  /60  Pulse 64  Resp 16  Ht 4' 11\" (1.499 m)  Wt 117 lb (53.1 kg)  BMI 23.63 kg/m2    General Appearance:   Awake, Alert, No acute distress.   HEENT:  Pupil equal and reactive to light. No scleral icterus; the mucous membranes were moist.   Neck: No cervical bruits. No JVD. No thyromegaly.     Chest: The spine was straight. The chest was symmetric.   Lungs:   Respirations unlabored; Lungs are clear to auscultation. No crackles. No wheezing.   Cardiovascular:   Regular rhythm and rate, normal first and second heart sounds with no murmurs. No rubs or gallops.    Abdomen:  Soft. No tenderness. Non-distended. Bowels sounds are present   Extremities: Equal tibial pulses. No leg edema.   Skin: No rashes or ulcers. Warm, Dry.   Musculoskeletal: No tenderness. No deformity.   Neurologic: Mood and affect are appropriate. No focal deficits.         EKG: Personally reviewed  Sinus tachycardia   Abnormal ECG     Cardiac Imaging " Studies  Coronary CTA on 7-:    The total Agatston calcium score is 460. A calcium score in this range places the individual in the 100th percentile when compared to an age and gender matched control group and implies a very high risk of cardiac events in the next ten years.    Technically difficult study. Study is limited secondary to significant motion artifact. The midportion of the left anterior descending is not optimally visualized.    Findings suggest potentially significant stenosis in the early midportion of the right coronary artery with soft plaque and diffuse calcified plaque. Estimated luminal stenosis of 60-70%.    Diffuse disease suggested in the proximal and mid left anterior descending. Moderate diffuse disease suggested although the LAD is suboptimally visualized in its midsection. Estimated luminal stenosis of 50-60%.    Please see radiology interpretation for additional findings.    Lab Review   Lab Results   Component Value Date     (L) 02/12/2018    K 4.0 02/12/2018    CL 96 (L) 02/12/2018    CO2 31 02/12/2018    BUN 9 02/12/2018    CREATININE 0.67 02/12/2018    CALCIUM 9.8 02/12/2018     Lab Results   Component Value Date    WBC 10.9 04/19/2017    HGB 14.5 04/19/2017    HCT 41.4 04/19/2017    MCV 83 04/19/2017     04/19/2017     Lab Results   Component Value Date    CHOL 161 01/02/2018    CHOL 138 11/14/2017    CHOL 277 (H) 09/29/2016     Lab Results   Component Value Date    HDL 67 01/02/2018    HDL 53 11/14/2017    HDL 64 09/29/2016     Lab Results   Component Value Date    LDLCALC 78 01/02/2018    LDLCALC 69 11/14/2017    LDLCALC 187 (H) 09/29/2016     Lab Results   Component Value Date    TRIG 80 01/02/2018    TRIG 80 11/14/2017    TRIG 130 09/29/2016     Lab Results   Component Value Date    TSH 1.75 11/25/2016

## 2021-06-27 NOTE — PROGRESS NOTES
Progress Notes by Michael Ospina MD at 12/20/2018  1:30 PM     Author: Michael Ospina MD Service: -- Author Type: Physician    Filed: 12/20/2018  2:31 PM Encounter Date: 12/20/2018 Status: Signed    : Michael Ospina MD (Physician)           Click to link to [facility] NOTE       Assessment/Plan:   1.  Coronary artery disease: Her coronary CT angiogram was reported mild to moderate stenosis, significant calcified lesions.  Nonobstructive.  She has no shortness of breath and chest pain.  Continue aspirin 81 mg daily, atenolol 25 mg daily and Crestor 5 mg at bedtime.    2.  Essential hypertension: Her blood pressure is high due to recently changed medications.  Again she will call me to tell what medication she has been taking.  If she is on amlodipine 5 mg daily, increased today to 10 mg daily for better blood pressure control continue atenolol 25 mg daily, hydrochlorothiazide 12.5 mg daily and losartan 100 mg daily.      3.  Hypercholesterolemia: Continue Crestor 5 mg at bedtime.  Her recent LDL was 69.    .    Thank you for the opportunity to be involved in the care of Arabella Suero. If you have any questions, please feel free to contact me.  I will see the patient again in 12 months and as needed.    Much or all of the text in this note was generated through the use of Dragon Dictate voice-to-text software. Errors in spelling or words which seem out of context are unintentional.   Sound alike errors, in particular, may have escaped editing.       History of Present Illness:   It is my pleasure to see Arabella Suero at the [facility] clinic for routine cardiology follow-up.  Arabella Suero is an 83 y.o. female with a medical history of essential hypertension, hypercholesterolemia, history of CVA, coronary artery disease.    The patient states that she still has left arm pain.  She had neurology evaluation, but refused to do further evaluation.  She had no chest pain, shortness of  breath, palpitations, dizziness, orthopnea, PND or leg edema.  Her blood pressure has been high, 160 mmHg in systolic today.  Her medication was changed recently.  She is not quite sure the medication she has been taking at home.  She will call me back to clear medications she has been taking.      Past Medical History:     Patient Active Problem List   Diagnosis   ? Essential hypertension   ? Hypercholesterolemia   ? Cerebrovascular accident (CVA) due to embolism (H)   ? Hypokalemia   ? Hyponatremia   ? Diarrhea due to drug   ? Coronary artery disease due to calcified coronary lesion       Past Surgical History:     Past Surgical History:   Procedure Laterality Date   ? ABDOMINAL SURGERY     ? BREAST SURGERY     ? carpel tunnel -left Left    ? HYSTERECTOMY         Family History:     Family History   Problem Relation Age of Onset   ? Congenital heart disease Father         Social History:    reports that she quit smoking about 41 years ago. she has never used smokeless tobacco. She reports that she does not drink alcohol or use drugs.    Review of Systems:   General: WNL  Eyes: WNL  Ears/Nose/Throat: WNL  Lungs: Snoring  Heart: Arm Pain  Stomach: Constipation, Diarrhea  Bladder: WNL  Muscle/Joints: WNL  Skin: WNL  Nervous System: WNL  Mental Health: Anxiety     Blood: WNL    Meds:     Current Outpatient Medications:   ?  ALPRAZolam (XANAX XR) 1 MG 24 hr tablet, Take 1 mg by mouth daily., Disp: , Rfl:   ?  ALPRAZolam (XANAX) 0.5 MG tablet, Take 0.5 mg by mouth 4 (four) times a day as needed for anxiety. , Disp: , Rfl:   ?  amLODIPine (NORVASC) 10 MG tablet, Take 0.5 tablets (5 mg total) by mouth at bedtime., Disp: 30 tablet, Rfl: 11  ?  aspirin 81 MG EC tablet, Take 81 mg by mouth daily., Disp: , Rfl:   ?  atenolol (TENORMIN) 25 MG tablet, Take 25 mg by mouth daily., Disp: , Rfl:   ?  cholecalciferol, vitamin D3, (VITAMIN D3) 2,000 unit cap, Take 2,000 Units by mouth daily., Disp: , Rfl:   ?  fluticasone (FLONASE)  "50 mcg/actuation nasal spray, 1 spray into each nostril as needed for rhinitis., Disp: , Rfl:   ?  hydroCHLOROthiazide (HYDRODIURIL) 12.5 MG tablet, Take 12.5 mg by mouth daily., Disp: , Rfl:   ?  losartan (COZAAR) 100 MG tablet, Take 100 mg by mouth daily., Disp: , Rfl:   ?  multivitamin capsule, Take 1 capsule by mouth daily., Disp: , Rfl:   ?  pantoprazole (PROTONIX) 40 MG tablet, Take 40 mg by mouth daily., Disp: , Rfl:   ?  potassium chloride SA (K-DUR,KLOR-CON) 20 MEQ tablet, Take 20 mEq by mouth daily., Disp: , Rfl:   ?  rosuvastatin (CRESTOR) 5 MG tablet, TAKE ONE TABLET BY MOUTH ONCE DAILY AT BEDTIME, Disp: 90 tablet, Rfl: 1  ?  vitamin E 400 UNIT capsule, Take 400 Units by mouth daily., Disp: , Rfl:     Allergies:   Other drug allergy (see comments)      Objective:      Physical Exam  116 lb (52.6 kg)  4' 11\" (1.499 m)  Body mass index is 23.43 kg/m .  /80 (Patient Site: Right Arm, Patient Position: Sitting, Cuff Size: Adult Regular)   Pulse 72   Resp 16   Ht 4' 11\" (1.499 m)   Wt 116 lb (52.6 kg)   BMI 23.43 kg/m      General Appearance:   Awake, Alert, No acute distress.   HEENT:  Pupil equal and reactive to light. No scleral icterus; the mucous membranes were moist.   Neck: No cervical bruits. No JVD. No thyromegaly.     Chest: The spine was straight. The chest was symmetric.   Lungs:   Respirations unlabored; Lungs are clear to auscultation. No crackles. No wheezing.   Cardiovascular:   Regular rhythm and rate, normal first and second heart sounds with no murmurs. No rubs or gallops.    Abdomen:  Soft. No tenderness. Non-distended. Bowels sounds are present   Extremities: Equal tibial pulses. No leg edema.   Skin: No rashes or ulcers. Warm, Dry.   Musculoskeletal: No tenderness. No deformity.   Neurologic: Mood and affect are appropriate. No focal deficits.         EKG: Personally reviewed  Sinus rhythm   Normal ECG   When compared with ECG of 18-APR-2017 23:21,   No significant change was " found      Cardiac Imaging Studies  Coronary CTA on 7-:    The total Agatston calcium score is 460. A calcium score in this range places the individual in the 100th percentile when compared to an age and gender matched control group and implies a very high risk of cardiac events in the next ten years.    Technically difficult study. Study is limited secondary to significant motion artifact. The midportion of the left anterior descending is not optimally visualized.    Findings suggest potentially significant stenosis in the early midportion of the right coronary artery with soft plaque and diffuse calcified plaque. Estimated luminal stenosis of 60-70%.    Diffuse disease suggested in the proximal and mid left anterior descending. Moderate diffuse disease suggested although the LAD is suboptimally visualized in its midsection. Estimated luminal stenosis of 50-60%.    Please see radiology interpretation for additional findings.    Lab Review   Lab Results   Component Value Date     (L) 05/21/2018    K 3.8 05/21/2018    CL 96 (L) 05/21/2018    CO2 23 05/21/2018    BUN 10 05/21/2018    CREATININE 0.77 05/21/2018    CALCIUM 9.6 05/21/2018     Lab Results   Component Value Date    WBC 6.7 05/18/2018    HGB 14.6 05/18/2018    HCT 42.2 05/18/2018    MCV 86 05/18/2018     05/18/2018     Lab Results   Component Value Date    CHOL 161 01/02/2018    CHOL 138 11/14/2017    CHOL 277 (H) 09/29/2016     Lab Results   Component Value Date    HDL 67 01/02/2018    HDL 53 11/14/2017    HDL 64 09/29/2016     Lab Results   Component Value Date    LDLCALC 78 01/02/2018    LDLCALC 69 11/14/2017    LDLCALC 187 (H) 09/29/2016     Lab Results   Component Value Date    TRIG 80 01/02/2018    TRIG 80 11/14/2017    TRIG 130 09/29/2016     Lab Results   Component Value Date    TROPONINI <0.01 05/18/2018     Lab Results   Component Value Date    TSH 1.12 05/21/2018

## 2021-06-29 NOTE — PROGRESS NOTES
Progress Notes by Michael Ospina MD at 8/20/2020 11:10 AM     Author: Michael Ospina MD Service: -- Author Type: Physician    Filed: 8/20/2020 12:00 PM Encounter Date: 8/20/2020 Status: Signed    : Michael Ospina MD (Physician)           Click to link to [facility] NOTE       Assessment/Plan:   1.  Coronary artery disease: Her coronary CT angiogram was reported mild to moderate stenosis.  Nonobstructive.  She has chest pain.  Echocardiogram recently was reported normal left ventricular size, wall motion and function.  Her dyspnea on exertion occurred when she is on mask.  Advised that she clearly told me that she has been doing fine.  Continue aspirin 81 mg daily, atenolol 25 mg daily and Crestor 5 mg at bedtime.    2.  Essential hypertension: Her blood pressure has been controlled.  Continue amlodipine 5 mg daily, atenolol 25 mg daily, hydrochlorothiazide 12.5 mg daily and losartan 100 mg daily.      3.  Hypercholesterolemia: Continue Crestor 5 mg at bedtime.  Her recent LDL was controlled.        Thank you for the opportunity to be involved in the care of Arabella Suero. If you have any questions, please feel free to contact me.  I will see the patient again in 12 months and as needed.    Much or all of the text in this note was generated through the use of Dragon Dictate voice-to-text software. Errors in spelling or words which seem out of context are unintentional.   Sound alike errors, in particular, may have escaped editing.       History of Present Illness:   It is my pleasure to see Arabella Suero at the [facility] clinic for routine cardiology follow-up.  Arabella Suero is an 84 y.o. female with a medical history of essential hypertension, hypercholesterolemia, history of CVA, coronary artery disease.    The patient states that she had no chest pain, palpitations, dizziness, orthopnea, PND or leg edema.  She does complains of dyspnea on exertion if she is wearing mask.   She said she feels fine and fine to do exercise by walking without shortness of breath if she is not on mask.  She is anxious sometimes.  Her blood pressure and heart rate are controlled.  No side effects from her current medications.     She had an echocardiogram on July 23, 2020 which was reported normal left ventricular size, function and wall motion, LVEF was 70%, normal right size and function, no obvious valvular disease.  No evidence of pulmonary hypertension.    Past Medical History:     Patient Active Problem List   Diagnosis   ? Essential hypertension   ? Hypercholesterolemia   ? Cerebrovascular accident (CVA) due to embolism (H)   ? Coronary artery disease due to calcified coronary lesion   ? Vitamin D deficiency   ? Osteoarthritis of hand   ? Enthesopathy of hip region   ? Anxiety, generalized       Past Surgical History:     Past Surgical History:   Procedure Laterality Date   ? ABDOMINAL SURGERY     ? BREAST SURGERY     ? carpel tunnel -left Left    ? HYSTERECTOMY         Family History:     Family History   Problem Relation Age of Onset   ? Congenital heart disease Father         Social History:    reports that she has never smoked. She has never used smokeless tobacco. She reports that she does not drink alcohol or use drugs.    Review of Systems:   General: Weight Loss  Eyes: WNL  Ears/Nose/Throat: WNL  Lungs: Snoring  Heart: WNL  Stomach: WNL  Bladder: WNL  Muscle/Joints: WNL  Skin: WNL  Nervous System: WNL  Mental Health: Anxiety     Blood: WNL    Meds:     Current Outpatient Medications:   ?  albuterol (PROVENTIL) 2.5 mg /3 mL (0.083 %) nebulizer solution, Take 2.5 mg by nebulization every 6 (six) hours as needed. , Disp: , Rfl: 2  ?  ALPRAZolam (XANAX XR) 1 MG 24 hr tablet, TAKE 1 TABLET BY MOUTH AT NOON AND AT BEDTIME (Patient taking differently: Take 1 mg by mouth 2 (two) times a day. ), Disp: 60 tablet, Rfl: 0  ?  ALPRAZolam (XANAX) 0.5 MG tablet, TAKE 1 TABLET BY MOUTH ONCE DAILY AS NEEDED  "FOR ANXIETY, Disp: 30 tablet, Rfl: 0  ?  amLODIPine (NORVASC) 5 MG tablet, Take 1 tablet (5 mg total) by mouth at bedtime., Disp: 90 tablet, Rfl: 3  ?  aspirin 81 MG EC tablet, Take 81 mg by mouth daily., Disp: , Rfl:   ?  atenoloL (TENORMIN) 25 MG tablet, Take 1 tablet (25 mg total) by mouth daily., Disp: 90 tablet, Rfl: 3  ?  cetirizine (ZYRTEC) 10 MG tablet, Take 1 tablet (10 mg total) by mouth daily., Disp:  , Rfl: 0  ?  cholecalciferol, vitamin D3, (VITAMIN D3) 2,000 unit cap, Take 2,000 Units by mouth daily., Disp: , Rfl:   ?  fluticasone (VERAMYST) 27.5 mcg/actuation nasal spray, One spray in each nostril daily, Disp: , Rfl:   ?  hydroCHLOROthiazide (HYDRODIURIL) 12.5 MG tablet, Take 1 tablet (12.5 mg total) by mouth daily., Disp: 90 tablet, Rfl: 3  ?  losartan (COZAAR) 100 MG tablet, Take 1 tablet (100 mg total) by mouth daily., Disp: 90 tablet, Rfl: 2  ?  MICROCHAMBER Spcr, FOR HOME USE WITH INHALER, Disp: , Rfl: 0  ?  multivitamin capsule, Take 1 capsule by mouth daily., Disp: , Rfl:   ?  pantoprazole (PROTONIX) 40 MG tablet, Take 40 mg by mouth daily as needed. , Disp: , Rfl:   ?  potassium chloride (K-DUR,KLOR-CON) 20 MEQ tablet, Take 1 tablet (20 mEq total) by mouth daily., Disp: 30 tablet, Rfl: 3  ?  rosuvastatin (CRESTOR) 5 MG tablet, Take 1 tablet (5 mg total) by mouth at bedtime., Disp: 90 tablet, Rfl: 3  ?  SPIRIVA WITH HANDIHALER 18 mcg inhalation capsule, ONE CAPSULE (2 PUFF TOTAL) INTO INHALER AND INHALE DAILY, Disp: , Rfl:   ?  VENTOLIN HFA 90 mcg/actuation inhaler, Inhale 2 puffs every 4 (four) hours as needed., Disp: 1 Inhaler, Rfl: 4  ?  vitamin E 400 UNIT capsule, Take 400 Units by mouth daily., Disp: , Rfl:     Allergies:   Other drug allergy (see comments)      Objective:      Physical Exam  107 lb (48.5 kg)  4' 10.5\" (1.486 m)  Body mass index is 21.98 kg/m .  /78 (Patient Site: Right Arm, Patient Position: Sitting, Cuff Size: Adult Small)   Pulse 68   Resp 16   Ht 4' 10.5\" " (1.486 m)   Wt 107 lb (48.5 kg)   BMI 21.98 kg/m      General Appearance:   Awake, Alert, No acute distress.   HEENT:  Pupil equal and reactive to light. No scleral icterus; the mucous membranes were moist.   Neck: No cervical bruits. No JVD. No thyromegaly.     Chest: The spine was straight. The chest was symmetric.   Lungs:   Respirations unlabored; Lungs are clear to auscultation. No crackles. No wheezing.   Cardiovascular:   Regular rhythm and rate, normal first and second heart sounds with no murmurs. No rubs or gallops.    Abdomen:  Soft. No tenderness. Non-distended. Bowels sounds are present   Extremities: Equal tibial pulses. No leg edema.   Skin: No rashes or ulcers. Warm, Dry.   Musculoskeletal: No tenderness. No deformity.   Neurologic: Mood and affect are appropriate. No focal deficits.         EKG: Personally reviewed  Sinus rhythm   Normal ECG   When compared with ECG of 18-APR-2017 23:21,   No significant change was found      Cardiac Imaging Studies  ECHO on 7-:    Normal left ventricular size and systolic function.    Left ventricle ejection fraction is normal. The calculated left ventricular ejection fraction is 70%.    Left ventricular diastolic function is normal.    Normal right ventricular size and systolic function.    The aortic valve is sclerotic without reduced excursion. No aortic stenosis. Trace regurgitation.    No pulmonary hypertension present. The estimated systolic pulmonary artery pressure is 20 mmhg.    Estimated central venous pressure equal to 3 mmHg.    No previous study for comparison.    Coronary CTA on 7-:    The total Agatston calcium score is 460. A calcium score in this range places the individual in the 100th percentile when compared to an age and gender matched control group and implies a very high risk of cardiac events in the next ten years.    Technically difficult study. Study is limited secondary to significant motion artifact. The midportion of the  left anterior descending is not optimally visualized.    Findings suggest potentially significant stenosis in the early midportion of the right coronary artery with soft plaque and diffuse calcified plaque. Estimated luminal stenosis of 60-70%.    Diffuse disease suggested in the proximal and mid left anterior descending. Moderate diffuse disease suggested although the LAD is suboptimally visualized in its midsection. Estimated luminal stenosis of 50-60%.    Please see radiology interpretation for additional findings.    Lab Review   Lab Results   Component Value Date     (L) 05/21/2020    K 3.7 05/21/2020    CL 99 05/21/2020    CO2 23 05/21/2020    BUN 10 05/21/2020    CREATININE 0.71 05/21/2020    CALCIUM 8.6 05/21/2020     Lab Results   Component Value Date    WBC 5.5 05/21/2020    HGB 14.4 05/21/2020    HCT 41.2 05/21/2020    MCV 88 05/21/2020     05/21/2020     Lab Results   Component Value Date    CHOL 151 01/14/2020    CHOL 154 05/14/2019    CHOL 161 01/02/2018     Lab Results   Component Value Date    HDL 49 (L) 01/14/2020    HDL 62 05/14/2019    HDL 67 01/02/2018     Lab Results   Component Value Date    LDLCALC 64 01/14/2020    LDLCALC 81 05/14/2019    LDLCALC 78 01/02/2018     Lab Results   Component Value Date    TRIG 190 (H) 01/14/2020    TRIG 53 05/14/2019    TRIG 80 01/02/2018     Lab Results   Component Value Date    TROPONINI <0.01 12/13/2019     Lab Results   Component Value Date    TSH 1.47 05/21/2020

## 2021-06-30 NOTE — PROGRESS NOTES
Progress Notes by Michael Ospina MD at 4/16/2021 12:50 PM     Author: Michael Ospina MD Service: -- Author Type: Physician    Filed: 4/16/2021  1:57 PM Encounter Date: 4/16/2021 Status: Signed    : Michael Ospina MD (Physician)           Click to link to [facility] NOTE       Assessment/Plan:   1.  Coronary artery disease: Her coronary CT angiogram was reported mild to moderate stenosis.  Nonobstructive.  She developed intermittent left arm pain with tingling which could be caused by anginal equivalent.  She has significantly elevated coronary calcium score 464 coronary CT angiogram in 2017.  After discussion, pharmacological nuclear stress test is requested for ischemic evaluation.  Continue aspirin, atenolol, Crestor.    2.  Essential hypertension: Her blood pressure has been controlled.  Continue amlodipine 5 mg daily, atenolol 25 mg daily, hydrochlorothiazide 12.5 mg daily and losartan 100 mg daily.      3.  Hypercholesterolemia: Her last LDL was elevated to 107, increase Crestor from 5 mg to 10 mg at bedtime.  Repeat lipid profile and liver function in 2 months.      Thank you for the opportunity to be involved in the care of Arabella Suero. If you have any questions, please feel free to contact me.  I will see the patient again in 6 months and as needed.    Much or all of the text in this note was generated through the use of Dragon Dictate voice-to-text software. Errors in spelling or words which seem out of context are unintentional.   Sound alike errors, in particular, may have escaped editing.       History of Present Illness:   It is my pleasure to see Arabella Suero at the [facility] clinic for routine cardiology follow-up.  Arabella Suero is an 85 y.o. female with a medical history of essential hypertension, hypercholesterolemia, history of CVA, coronary artery disease.    The patient states that she developed left arm pain with tingling recently.  It is on and off,  sometimes associated with exertion.  Her left arm pain lasted variable duration.  She did not feel well.  She had no obvious anterior chest pain.  She has a chronic cough shortness of breath due to COPD.  She had no palpitations, dizziness, orthopnea, PND or leg edema.  Her blood pressure and heart rate are controlled.  No side effects from her current medications.     Past Medical History:     Patient Active Problem List   Diagnosis   ? Essential hypertension   ? Hypercholesterolemia   ? Cerebrovascular accident (CVA) due to embolism (H)   ? Coronary artery disease due to calcified coronary lesion   ? Vitamin D deficiency   ? Osteoarthritis of hand   ? Enthesopathy of hip region   ? Anxiety, generalized   ? Abnormal feces   ? Bowel habit changes   ? Diverticular disease of large intestine   ? Epigastric pain   ? Rectal hemorrhage   ? Iron deficiency anemia   ? Loose stools   ? Nausea       Past Surgical History:     Past Surgical History:   Procedure Laterality Date   ? ABDOMINAL SURGERY     ? BREAST SURGERY     ? carpel tunnel -left Left    ? HYSTERECTOMY         Family History:     Family History   Problem Relation Age of Onset   ? Congenital heart disease Father         Social History:    reports that she has never smoked. She has never used smokeless tobacco. She reports that she does not drink alcohol or use drugs.    Review of Systems:   General: WNL  Eyes: WNL  Ears/Nose/Throat: WNL  Lungs: Snoring  Heart: Arm Pain  Stomach: Heartburn  Bladder: WNL  Muscle/Joints: WNL  Skin: WNL  Nervous System: Daytime Sleepiness  Mental Health: Anxiety     Blood: WNL    Meds:     Current Outpatient Medications:   ?  albuterol (PROVENTIL) 2.5 mg /3 mL (0.083 %) nebulizer solution, Take 2.5 mg by nebulization every 6 (six) hours as needed. , Disp: , Rfl: 2  ?  ALPRAZolam (XANAX XR) 1 MG 24 hr tablet, TAKE 1 TABLET BY MOUTH AT NOON AND AT BEDTIME, Disp: 60 tablet, Rfl: 0  ?  ALPRAZolam (XANAX) 0.5 MG tablet, TAKE 1 TABLET BY  "MOUTH ONCE DAILY AS NEEDED FOR ANXIETY, Disp: 30 tablet, Rfl: 0  ?  amLODIPine (NORVASC) 5 MG tablet, Take 1 tablet (5 mg total) by mouth at bedtime., Disp: 90 tablet, Rfl: 3  ?  aspirin 81 MG EC tablet, Take 81 mg by mouth daily., Disp: , Rfl:   ?  atenoloL (TENORMIN) 25 MG tablet, Take 1 tablet (25 mg total) by mouth daily., Disp: 90 tablet, Rfl: 3  ?  cetirizine (ZYRTEC) 10 MG tablet, Take 1 tablet (10 mg total) by mouth daily., Disp:  , Rfl: 0  ?  cholecalciferol, vitamin D3, (VITAMIN D3) 2,000 unit cap, Take 2,000 Units by mouth daily., Disp: , Rfl:   ?  diclofenac sodium (VOLTAREN) 1 % Gel, , Disp: , Rfl:   ?  fluticasone (VERAMYST) 27.5 mcg/actuation nasal spray, One spray in each nostril daily, Disp: , Rfl:   ?  hydroCHLOROthiazide (HYDRODIURIL) 12.5 MG tablet, Take 1 tablet (12.5 mg total) by mouth daily., Disp: 90 tablet, Rfl: 3  ?  losartan (COZAAR) 100 MG tablet, Take 1 tablet by mouth once daily, Disp: 90 tablet, Rfl: 2  ?  MICROCHAMBER Spcr, FOR HOME USE WITH INHALER, Disp: , Rfl: 0  ?  multivitamin capsule, Take 1 capsule by mouth daily., Disp: , Rfl:   ?  pantoprazole (PROTONIX) 40 MG tablet, Take 40 mg by mouth daily as needed. , Disp: , Rfl:   ?  potassium chloride (K-DUR,KLOR-CON) 20 MEQ tablet, Take 1 tablet (20 mEq total) by mouth daily., Disp: 30 tablet, Rfl: 3  ?  rosuvastatin (CRESTOR) 10 MG tablet, Take 0.5 tablets (5 mg total) by mouth at bedtime., Disp: 30 tablet, Rfl: 11  ?  SPIRIVA WITH HANDIHALER 18 mcg inhalation capsule, ONE CAPSULE (2 PUFF TOTAL) INTO INHALER AND INHALE DAILY, Disp: 90 capsule, Rfl: 1  ?  VENTOLIN HFA 90 mcg/actuation inhaler, INHALE 2 PUFFS BY MOUTH EVERY 4 HOURS AS NEEDED, Disp: 36 g, Rfl: 1  ?  vitamin E 400 UNIT capsule, Take 400 Units by mouth daily., Disp: , Rfl:     Allergies:   Other drug allergy (see comments)      Objective:      Physical Exam  114 lb (51.7 kg)  4' 10.5\" (1.486 m)  Body mass index is 23.42 kg/m .  /60 (Patient Site: Left Arm, Patient " "Position: Sitting, Cuff Size: Adult Small)   Pulse 62   Resp 16   Ht 4' 10.5\" (1.486 m)   Wt 114 lb (51.7 kg)   BMI 23.42 kg/m      General Appearance:   Awake, Alert, No acute distress.   HEENT:  Pupil equal and reactive to light. No scleral icterus; the mucous membranes were moist.   Neck: No cervical bruits. No JVD. No thyromegaly.     Chest: The spine was straight. The chest was symmetric.   Lungs:   Respirations unlabored; Lungs are clear to auscultation. No crackles. No wheezing.   Cardiovascular:   Regular rhythm and rate, normal first and second heart sounds with no murmurs. No rubs or gallops.    Abdomen:  Soft. No tenderness. Non-distended. Bowels sounds are present   Extremities: Equal tibial pulses. No leg edema.   Skin: No rashes or ulcers. Warm, Dry.   Musculoskeletal: No tenderness. No deformity.   Neurologic: Mood and affect are appropriate. No focal deficits.         EKG: Personally reviewed  Sinus rhythm   Normal ECG   When compared with ECG of 18-APR-2017 23:21,   No significant change was found      Cardiac Imaging Studies  ECHO on 7-:    Normal left ventricular size and systolic function.    Left ventricle ejection fraction is normal. The calculated left ventricular ejection fraction is 70%.    Left ventricular diastolic function is normal.    Normal right ventricular size and systolic function.    The aortic valve is sclerotic without reduced excursion. No aortic stenosis. Trace regurgitation.    No pulmonary hypertension present. The estimated systolic pulmonary artery pressure is 20 mmhg.    Estimated central venous pressure equal to 3 mmHg.    No previous study for comparison.    Coronary CTA on 7-:    The total Agatston calcium score is 460. A calcium score in this range places the individual in the 100th percentile when compared to an age and gender matched control group and implies a very high risk of cardiac events in the next ten years.    Technically difficult study. " Study is limited secondary to significant motion artifact. The midportion of the left anterior descending is not optimally visualized.    Findings suggest potentially significant stenosis in the early midportion of the right coronary artery with soft plaque and diffuse calcified plaque. Estimated luminal stenosis of 60-70%.    Diffuse disease suggested in the proximal and mid left anterior descending. Moderate diffuse disease suggested although the LAD is suboptimally visualized in its midsection. Estimated luminal stenosis of 50-60%.    Please see radiology interpretation for additional findings.    Lab Review   Lab Results   Component Value Date     09/18/2020    K 4.0 09/18/2020     09/18/2020    CO2 26 09/18/2020    BUN 12 09/18/2020    CREATININE 0.71 09/18/2020    CALCIUM 9.4 09/18/2020     Lab Results   Component Value Date    WBC 5.5 09/18/2020    HGB 14.5 09/18/2020    HCT 44.7 09/18/2020    MCV 90 09/18/2020     09/18/2020     Lab Results   Component Value Date    CHOL 203 (H) 09/18/2020    CHOL 151 01/14/2020    CHOL 154 05/14/2019     Lab Results   Component Value Date    HDL 57 09/18/2020    HDL 49 (L) 01/14/2020    HDL 62 05/14/2019     Lab Results   Component Value Date    LDLCALC 117 09/18/2020    LDLCALC 64 01/14/2020    LDLCALC 81 05/14/2019     Lab Results   Component Value Date    TRIG 143 09/18/2020    TRIG 190 (H) 01/14/2020    TRIG 53 05/14/2019     Lab Results   Component Value Date    TROPONINI <0.01 12/13/2019     Lab Results   Component Value Date    TSH 1.31 09/18/2020

## 2021-07-03 NOTE — ADDENDUM NOTE
Addendum Note by Rain Martinez MD at 7/2/2020  3:27 PM     Author: Rain Martinez MD Service: -- Author Type: Physician    Filed: 7/2/2020  3:27 PM Encounter Date: 7/1/2020 Status: Signed    : Rain Martinez MD (Physician)    Addended by: RAIN MARTINEZ on: 7/2/2020 03:27 PM        Modules accepted: Orders

## 2021-07-06 VITALS — BODY MASS INDEX: 23.42 KG/M2 | WEIGHT: 114 LBS

## 2021-07-13 ENCOUNTER — RECORDS - HEALTHEAST (OUTPATIENT)
Dept: ADMINISTRATIVE | Facility: CLINIC | Age: 86
End: 2021-07-13

## 2021-07-21 ENCOUNTER — RECORDS - HEALTHEAST (OUTPATIENT)
Dept: ADMINISTRATIVE | Facility: CLINIC | Age: 86
End: 2021-07-21

## 2021-08-01 ENCOUNTER — COMMUNICATION - HEALTHEAST (OUTPATIENT)
Dept: ADMINISTRATIVE | Facility: CLINIC | Age: 86
End: 2021-08-01

## 2021-08-12 ENCOUNTER — LAB REQUISITION (OUTPATIENT)
Dept: LAB | Facility: CLINIC | Age: 86
End: 2021-08-12

## 2021-08-12 DIAGNOSIS — I10 ESSENTIAL (PRIMARY) HYPERTENSION: ICD-10-CM

## 2021-08-12 LAB
ALBUMIN SERPL-MCNC: 4.2 G/DL (ref 3.5–5)
ALP SERPL-CCNC: 62 U/L (ref 45–120)
ALT SERPL W P-5'-P-CCNC: 17 U/L (ref 0–45)
ANION GAP SERPL CALCULATED.3IONS-SCNC: 10 MMOL/L (ref 5–18)
AST SERPL W P-5'-P-CCNC: 19 U/L (ref 0–40)
BILIRUB SERPL-MCNC: 0.6 MG/DL (ref 0–1)
BUN SERPL-MCNC: 13 MG/DL (ref 8–28)
CALCIUM SERPL-MCNC: 9.6 MG/DL (ref 8.5–10.5)
CHLORIDE BLD-SCNC: 103 MMOL/L (ref 98–107)
CO2 SERPL-SCNC: 24 MMOL/L (ref 22–31)
CREAT SERPL-MCNC: 0.82 MG/DL (ref 0.6–1.1)
GFR SERPL CREATININE-BSD FRML MDRD: 66 ML/MIN/1.73M2
GLUCOSE BLD-MCNC: 91 MG/DL (ref 70–125)
POTASSIUM BLD-SCNC: 4.3 MMOL/L (ref 3.5–5)
PROT SERPL-MCNC: 6.7 G/DL (ref 6–8)
SODIUM SERPL-SCNC: 137 MMOL/L (ref 136–145)

## 2021-08-12 PROCEDURE — 84443 ASSAY THYROID STIM HORMONE: CPT | Performed by: FAMILY MEDICINE

## 2021-08-12 PROCEDURE — 80053 COMPREHEN METABOLIC PANEL: CPT | Performed by: FAMILY MEDICINE

## 2021-08-13 LAB — TSH SERPL DL<=0.005 MIU/L-ACNC: 2.11 UIU/ML (ref 0.3–5)

## 2021-10-28 ENCOUNTER — LAB REQUISITION (OUTPATIENT)
Dept: LAB | Facility: CLINIC | Age: 86
End: 2021-10-28

## 2021-10-28 DIAGNOSIS — R35.0 FREQUENCY OF MICTURITION: ICD-10-CM

## 2021-10-28 DIAGNOSIS — I10 ESSENTIAL (PRIMARY) HYPERTENSION: ICD-10-CM

## 2021-10-28 LAB
ANION GAP SERPL CALCULATED.3IONS-SCNC: 12 MMOL/L (ref 5–18)
BUN SERPL-MCNC: 14 MG/DL (ref 8–28)
CALCIUM SERPL-MCNC: 9.4 MG/DL (ref 8.5–10.5)
CHLORIDE BLD-SCNC: 103 MMOL/L (ref 98–107)
CO2 SERPL-SCNC: 25 MMOL/L (ref 22–31)
CREAT SERPL-MCNC: 0.71 MG/DL (ref 0.6–1.1)
GFR SERPL CREATININE-BSD FRML MDRD: 78 ML/MIN/1.73M2
GLUCOSE BLD-MCNC: 99 MG/DL (ref 70–125)
POTASSIUM BLD-SCNC: 3.9 MMOL/L (ref 3.5–5)
SODIUM SERPL-SCNC: 140 MMOL/L (ref 136–145)

## 2021-10-28 PROCEDURE — 87086 URINE CULTURE/COLONY COUNT: CPT | Performed by: PHYSICIAN ASSISTANT

## 2021-10-28 PROCEDURE — 80048 BASIC METABOLIC PNL TOTAL CA: CPT | Performed by: PHYSICIAN ASSISTANT

## 2021-10-29 LAB — BACTERIA UR CULT: NO GROWTH

## 2021-12-09 ENCOUNTER — OFFICE VISIT (OUTPATIENT)
Dept: CARDIOLOGY | Facility: CLINIC | Age: 86
End: 2021-12-09
Payer: COMMERCIAL

## 2021-12-09 VITALS
HEIGHT: 59 IN | RESPIRATION RATE: 20 BRPM | SYSTOLIC BLOOD PRESSURE: 162 MMHG | WEIGHT: 109.9 LBS | HEART RATE: 68 BPM | BODY MASS INDEX: 22.16 KG/M2 | DIASTOLIC BLOOD PRESSURE: 90 MMHG

## 2021-12-09 DIAGNOSIS — E78.00 HYPERCHOLESTEROLEMIA: ICD-10-CM

## 2021-12-09 DIAGNOSIS — I10 PRIMARY HYPERTENSION: ICD-10-CM

## 2021-12-09 DIAGNOSIS — I25.10 CORONARY ARTERY DISEASE INVOLVING NATIVE CORONARY ARTERY OF NATIVE HEART WITHOUT ANGINA PECTORIS: ICD-10-CM

## 2021-12-09 DIAGNOSIS — I25.10 CORONARY ARTERY DISEASE INVOLVING NATIVE CORONARY ARTERY OF NATIVE HEART WITHOUT ANGINA PECTORIS: Primary | ICD-10-CM

## 2021-12-09 PROCEDURE — 99215 OFFICE O/P EST HI 40 MIN: CPT | Performed by: INTERNAL MEDICINE

## 2021-12-09 RX ORDER — AMLODIPINE BESYLATE 5 MG/1
5 TABLET ORAL DAILY
Qty: 30 TABLET | Refills: 11 | Status: SHIPPED | OUTPATIENT
Start: 2021-12-09 | End: 2021-12-09

## 2021-12-09 RX ORDER — ROSUVASTATIN CALCIUM 10 MG/1
10 TABLET, COATED ORAL DAILY
Qty: 90 TABLET | Refills: 2 | Status: SHIPPED | OUTPATIENT
Start: 2021-12-09 | End: 2022-09-12

## 2021-12-09 RX ORDER — ROSUVASTATIN CALCIUM 10 MG/1
10 TABLET, COATED ORAL DAILY
Qty: 30 TABLET | Refills: 11 | Status: SHIPPED | OUTPATIENT
Start: 2021-12-09 | End: 2021-12-09

## 2021-12-09 RX ORDER — AMLODIPINE BESYLATE 5 MG/1
5 TABLET ORAL DAILY
Qty: 90 TABLET | Refills: 2 | Status: SHIPPED | OUTPATIENT
Start: 2021-12-09 | End: 2022-08-23

## 2021-12-09 ASSESSMENT — MIFFLIN-ST. JEOR: SCORE: 849.13

## 2021-12-09 NOTE — PROGRESS NOTES
Assessment/Plan:   1.  Coronary artery disease: The patient has no chest pain or shortness of breath.  Her coronary CT angiogram in 2017 was reported mild to moderate to stenosis, significantly elevated coronary calcium score to 464.    We discussed the nuclear stress test in April 2021 at the last visit.  She declined to do nuclear stress test or any other stress test.  She discontinued the Crestor and amlodipine by herself.  We discussed the management of coronary artery disease and hypertension.  Since her blood pressure is high, amlodipine 5 mg daily is restarted.  Her LDL is not controlled, restart Crestor 10 mg at bedtime.  Repeat lipid profile and liver function in 2 months.  The patient agreed with the plan.  Meantime, continue aspirin and atenolol.     2.  Essential hypertension: Her blood pressure is high.  Restart amlodipine 5 mg daily, Continue atenolol 25 mg daily and losartan 100 mg daily.       3.  Hypercholesterolemia:  Restart Crestor 10 mg at bedtime.  She could not tolerate high dose of statins. Repeat Lipid profile and ALT in 2 months.    Thank you for the opportunity to be involved in the care of Arabella Suero. If you have any questions, please feel free to contact me.  I will see the patient again in 6 months and as needed.    Much or all of the text in this note was generated through the use of Dragon Dictate voice-to-text software. Errors in spelling or words which seem out of context are unintentional. Sound alike errors, in particular, may have escaped editing.       History of Present Illness:   It is my pleasure to see Arabella Suero at the [facility] clinic for routine cardiology follow up.  Arabella Suero is a 86 year old female with a medical history of coronary artery disease, primary hypertension, dyslipidemia, history of a CVA.    The patient states that she has no chest pain, shortness of breath, palpitations, dizziness, orthopnea, PND or leg edema.  She stopped  amlodipine and Crestor by herself.  Her blood pressure is high, 160/70 mmHg.    Past Medical History:     Patient Active Problem List   Diagnosis     Essential hypertension     Hypercholesterolemia     Cerebrovascular accident (CVA) due to embolism (H)     Hypokalemia     Hyponatremia     Coronary artery disease due to calcified coronary lesion     Vitamin D deficiency     Osteoarthritis of hand     Enthesopathy of hip region     Anxiety, generalized     Abnormal feces     Bowel habit changes     Diverticular disease of large intestine     Epigastric pain     Rectal hemorrhage     Iron deficiency anemia     Loose stools     Nausea     Paranoia (H)     Centrilobular emphysema (H)     Hypomagnesemia     Moderate protein-calorie malnutrition (H)     Encephalopathy     Paranoid ideation (H)     Visual hallucinations     Sundowning     Cognitive and behavioral changes       Past Surgical History:     Past Surgical History:   Procedure Laterality Date     ABDOMEN SURGERY       BREAST SURGERY       HYSTERECTOMY       LASIK Bilateral 2000     OTHER SURGICAL HISTORY Left     carpel tunnel -left       Family History:     Family History   Problem Relation Age of Onset     Glaucoma No family hx of      Macular Degeneration No family hx of      Congenital heart disease Father         Social History:    reports that she has never smoked. She has never used smokeless tobacco. She reports that she does not drink alcohol and does not use drugs.    Review of Systems:   12 systems are reviewed negative except for in HPI.    Meds:     Current Outpatient Medications:      albuterol (PROVENTIL) (2.5 MG/3ML) 0.083% neb solution, Inhale 2.5 mg into the lungs, Disp: , Rfl:      aspirin (ASA) 81 MG EC tablet, Take 81 mg by mouth, Disp: , Rfl:      atenolol (TENORMIN) 25 MG tablet, Take 25 mg by mouth, Disp: , Rfl:      cetirizine (ZYRTEC) 10 MG tablet, Take 10 mg by mouth, Disp: , Rfl:      cholecalciferol 50 MCG (2000 UT) CAPS, Take 2,000  Units by mouth, Disp: , Rfl:      fluticasone (VERAMYST) 27.5 MCG/SPRAY nasal spray, One spray in each nostril daily, Disp: , Rfl:      losartan (COZAAR) 100 MG tablet, Take 1 tablet by mouth once daily, Disp: , Rfl:      moxifloxacin (VIGAMOX) 0.5 % ophthalmic solution, INSTILL 1 DROP IN RIGHT EYE EVERY HOUR, Disp: , Rfl:      multivitamin (DEKAS ESSENTIAL) capsule, Take 1 capsule by mouth, Disp: , Rfl:      potassium chloride ER (KLOR-CON M) 20 MEQ CR tablet, Take 20 mEq by mouth, Disp: , Rfl:      tiotropium (SPIRIVA HANDIHALER) 18 MCG inhaled capsule, ONE CAPSULE (2 PUFF TOTAL) INTO INHALER AND INHALE DAILY, Disp: , Rfl:      vitamin E (TOCOPHEROL) 400 units (180 mg) capsule, Take 400 Units by mouth, Disp: , Rfl:      ALPRAZolam (XANAX XR) 1 MG 24 hr tablet, Take 1 mg by mouth 2 times daily (Patient not taking: Reported on 12/9/2021), Disp: , Rfl:      amLODIPine (NORVASC) 5 MG tablet, Take 5 mg by mouth (Patient not taking: Reported on 12/9/2021), Disp: , Rfl:      diclofenac (VOLTAREN) 1 % topical gel, , Disp: , Rfl:      hydrochlorothiazide (HYDRODIURIL) 12.5 MG tablet, Take 12.5 mg by mouth (Patient not taking: Reported on 12/9/2021), Disp: , Rfl:      moxifloxacin (VIGAMOX) 0.5 % ophthalmic solution, Place 1 drop into the right eye 4 times daily (Patient not taking: Reported on 12/9/2021), Disp: 3 mL, Rfl: 0     pantoprazole (PROTONIX) 40 MG EC tablet, Take 40 mg by mouth (Patient not taking: Reported on 12/9/2021), Disp: , Rfl:      rosuvastatin (CRESTOR) 10 MG tablet, Take 5 mg by mouth (Patient not taking: Reported on 12/9/2021), Disp: , Rfl:      Spacer/Aero-Holding Chambers (MICROCHAMBER) CHARLES, FOR HOME USE WITH INHALER (Patient not taking: Reported on 12/9/2021), Disp: , Rfl:      White Petrolatum-Mineral Oil (REFRESH P.M.) OINT, Apply a small amount to each eye at bedtime (Patient not taking: Reported on 12/9/2021), Disp: 3.5 g, Rfl: 3    Allergies:   Hmg-coa-r inhibitors, Other drug allergy (see  "comments), and Tricyclic antidepressants      Objective:      Physical Exam  49.9 kg (109 lb 14.4 oz)  1.499 m (4' 11\")  Body mass index is 22.2 kg/m .  Ht 1.499 m (4' 11\")   Wt 49.9 kg (109 lb 14.4 oz)   BMI 22.20 kg/m      General Appearance:   Awake, Alert, No acute distress.   HEENT:  Pupil equal and reactive to light. No scleral icterus; the mucous membranes were moist.   Neck: No cervical bruits. No JVD. No thyromegaly.     Chest: The spine was straight. The chest was symmetric.   Lungs:   Respirations unlabored; Lungs are clear to auscultation. No crackles. No wheezing.   Cardiovascular:   Regular rhythm and rate, normal first and second heart sounds with no murmurs. No rubs or gallops.    Abdomen:  Soft. No tenderness. Non-distended. Bowels sounds are present   Extremities: Equal tibial pulses. No leg edema.   Skin: No rashes or ulcers. Warm, Dry.   Musculoskeletal: No tenderness. No deformity.   Neurologic: Mood and affect are appropriate. No focal deficits.         EKG: Personally reviewed  ECG on November 26, 2021 is reviewed  Normal sinus rhythm  No ischemic or ECG change    Cardiac Imaging Studies  Echocardiogram on July 23, 2020:    Normal left ventricular size and systolic function.    Left ventricle ejection fraction is normal. The calculated left ventricular ejection fraction is 70%.    Left ventricular diastolic function is normal.    Normal right ventricular size and systolic function.    The aortic valve is sclerotic without reduced excursion. No aortic stenosis. Trace regurgitation.    No pulmonary hypertension present. The estimated systolic pulmonary artery pressure is 20 mmhg.    Estimated central venous pressure equal to 3 mmHg.    No previous study for comparison.    Lab Review   Lab Results   Component Value Date     10/28/2021    CO2 25 10/28/2021    BUN 14 10/28/2021     Lab Results   Component Value Date    WBC 5.3 06/03/2021    HGB 14.7 06/03/2021    HCT 43.0 06/03/2021    MCV " 86 06/03/2021     06/03/2021     Lab Results   Component Value Date    CHOL 203 (H) 09/18/2020    CHOL 151 01/14/2020    CHOL 154 05/14/2019     Lab Results   Component Value Date    HDL 57 09/18/2020    HDL 49 (L) 01/14/2020    HDL 62 05/14/2019     No components found for: LDLCALC  Lab Results   Component Value Date    TRIG 143 09/18/2020    TRIG 190 (H) 01/14/2020    TRIG 53 05/14/2019     No components found for: CHOLHDL  Lab Results   Component Value Date    TROPONINI <0.01 12/13/2019     No results found for: BNP  Lab Results   Component Value Date    TSH 2.11 08/12/2021

## 2021-12-09 NOTE — LETTER
12/9/2021    BOB Love  [facility] [address]  [facility] [address]    RE: Arabella Suero       Dear Colleague,     I had the pleasure of seeing Arabella Suero in the [facility].            Assessment/Plan:   1.  Coronary artery disease: The patient has no chest pain or shortness of breath.  Her coronary CT angiogram in 2017 was reported mild to moderate to stenosis, significantly elevated coronary calcium score to 464.    We discussed the nuclear stress test in April 2021 at the last visit.  She declined to do nuclear stress test or any other stress test.  She discontinued the Crestor and amlodipine by herself.  We discussed the management of coronary artery disease and hypertension.  Since her blood pressure is high, amlodipine 5 mg daily is restarted.  Her LDL is not controlled, restart Crestor 10 mg at bedtime.  Repeat lipid profile and liver function in 2 months.  The patient agreed with the plan.  Meantime, continue aspirin and atenolol.     2.  Essential hypertension: Her blood pressure is high.  Restart amlodipine 5 mg daily, Continue atenolol 25 mg daily and losartan 100 mg daily.       3.  Hypercholesterolemia:  Restart Crestor 10 mg at bedtime.  She could not tolerate high dose of statins. Repeat Lipid profile and ALT in 2 months.    Thank you for the opportunity to be involved in the care of Arabella Suero. If you have any questions, please feel free to contact me.  I will see the patient again in 6 months and as needed.    Much or all of the text in this note was generated through the use of Dragon Dictate voice-to-text software. Errors in spelling or words which seem out of context are unintentional. Sound alike errors, in particular, may have escaped editing.       History of Present Illness:   It is my pleasure to see Arabella Suero at the [facility] clinic for routine cardiology follow up.  Arabella Suero is a 86 year old female with a medical history  of coronary artery disease, primary hypertension, dyslipidemia, history of a CVA.    The patient states that she has no chest pain, shortness of breath, palpitations, dizziness, orthopnea, PND or leg edema.  She stopped amlodipine and Crestor by herself.  Her blood pressure is high, 160/70 mmHg.    Past Medical History:     Patient Active Problem List   Diagnosis     Essential hypertension     Hypercholesterolemia     Cerebrovascular accident (CVA) due to embolism (H)     Hypokalemia     Hyponatremia     Coronary artery disease due to calcified coronary lesion     Vitamin D deficiency     Osteoarthritis of hand     Enthesopathy of hip region     Anxiety, generalized     Abnormal feces     Bowel habit changes     Diverticular disease of large intestine     Epigastric pain     Rectal hemorrhage     Iron deficiency anemia     Loose stools     Nausea     Paranoia (H)     Centrilobular emphysema (H)     Hypomagnesemia     Moderate protein-calorie malnutrition (H)     Encephalopathy     Paranoid ideation (H)     Visual hallucinations     Sundowning     Cognitive and behavioral changes       Past Surgical History:     Past Surgical History:   Procedure Laterality Date     ABDOMEN SURGERY       BREAST SURGERY       HYSTERECTOMY       LASIK Bilateral 2000     OTHER SURGICAL HISTORY Left     carpel tunnel -left       Family History:     Family History   Problem Relation Age of Onset     Glaucoma No family hx of      Macular Degeneration No family hx of      Congenital heart disease Father         Social History:    reports that she has never smoked. She has never used smokeless tobacco. She reports that she does not drink alcohol and does not use drugs.    Review of Systems:   12 systems are reviewed negative except for in HPI.    Meds:     Current Outpatient Medications:      albuterol (PROVENTIL) (2.5 MG/3ML) 0.083% neb solution, Inhale 2.5 mg into the lungs, Disp: , Rfl:      aspirin (ASA) 81 MG EC tablet, Take 81 mg by  mouth, Disp: , Rfl:      atenolol (TENORMIN) 25 MG tablet, Take 25 mg by mouth, Disp: , Rfl:      cetirizine (ZYRTEC) 10 MG tablet, Take 10 mg by mouth, Disp: , Rfl:      cholecalciferol 50 MCG (2000 UT) CAPS, Take 2,000 Units by mouth, Disp: , Rfl:      fluticasone (VERAMYST) 27.5 MCG/SPRAY nasal spray, One spray in each nostril daily, Disp: , Rfl:      losartan (COZAAR) 100 MG tablet, Take 1 tablet by mouth once daily, Disp: , Rfl:      moxifloxacin (VIGAMOX) 0.5 % ophthalmic solution, INSTILL 1 DROP IN RIGHT EYE EVERY HOUR, Disp: , Rfl:      multivitamin (DEKAS ESSENTIAL) capsule, Take 1 capsule by mouth, Disp: , Rfl:      potassium chloride ER (KLOR-CON M) 20 MEQ CR tablet, Take 20 mEq by mouth, Disp: , Rfl:      tiotropium (SPIRIVA HANDIHALER) 18 MCG inhaled capsule, ONE CAPSULE (2 PUFF TOTAL) INTO INHALER AND INHALE DAILY, Disp: , Rfl:      vitamin E (TOCOPHEROL) 400 units (180 mg) capsule, Take 400 Units by mouth, Disp: , Rfl:      ALPRAZolam (XANAX XR) 1 MG 24 hr tablet, Take 1 mg by mouth 2 times daily (Patient not taking: Reported on 12/9/2021), Disp: , Rfl:      amLODIPine (NORVASC) 5 MG tablet, Take 5 mg by mouth (Patient not taking: Reported on 12/9/2021), Disp: , Rfl:      diclofenac (VOLTAREN) 1 % topical gel, , Disp: , Rfl:      hydrochlorothiazide (HYDRODIURIL) 12.5 MG tablet, Take 12.5 mg by mouth (Patient not taking: Reported on 12/9/2021), Disp: , Rfl:      moxifloxacin (VIGAMOX) 0.5 % ophthalmic solution, Place 1 drop into the right eye 4 times daily (Patient not taking: Reported on 12/9/2021), Disp: 3 mL, Rfl: 0     pantoprazole (PROTONIX) 40 MG EC tablet, Take 40 mg by mouth (Patient not taking: Reported on 12/9/2021), Disp: , Rfl:      rosuvastatin (CRESTOR) 10 MG tablet, Take 5 mg by mouth (Patient not taking: Reported on 12/9/2021), Disp: , Rfl:      Spacer/Aero-Holding Chambers (MICROCHAMBER) CHARLES, FOR HOME USE WITH INHALER (Patient not taking: Reported on 12/9/2021), Disp: , Rfl:       "White Petrolatum-Mineral Oil (REFRESH P.M.) OINT, Apply a small amount to each eye at bedtime (Patient not taking: Reported on 12/9/2021), Disp: 3.5 g, Rfl: 3    Allergies:   Hmg-coa-r inhibitors, Other drug allergy (see comments), and Tricyclic antidepressants      Objective:      Physical Exam  49.9 kg (109 lb 14.4 oz)  1.499 m (4' 11\")  Body mass index is 22.2 kg/m .  Ht 1.499 m (4' 11\")   Wt 49.9 kg (109 lb 14.4 oz)   BMI 22.20 kg/m      General Appearance:   Awake, Alert, No acute distress.   HEENT:  Pupil equal and reactive to light. No scleral icterus; the mucous membranes were moist.   Neck: No cervical bruits. No JVD. No thyromegaly.     Chest: The spine was straight. The chest was symmetric.   Lungs:   Respirations unlabored; Lungs are clear to auscultation. No crackles. No wheezing.   Cardiovascular:   Regular rhythm and rate, normal first and second heart sounds with no murmurs. No rubs or gallops.    Abdomen:  Soft. No tenderness. Non-distended. Bowels sounds are present   Extremities: Equal tibial pulses. No leg edema.   Skin: No rashes or ulcers. Warm, Dry.   Musculoskeletal: No tenderness. No deformity.   Neurologic: Mood and affect are appropriate. No focal deficits.         EKG: Personally reviewed  ECG on November 26, 2021 is reviewed  Normal sinus rhythm  No ischemic or ECG change    Cardiac Imaging Studies  Echocardiogram on July 23, 2020:    Normal left ventricular size and systolic function.    Left ventricle ejection fraction is normal. The calculated left ventricular ejection fraction is 70%.    Left ventricular diastolic function is normal.    Normal right ventricular size and systolic function.    The aortic valve is sclerotic without reduced excursion. No aortic stenosis. Trace regurgitation.    No pulmonary hypertension present. The estimated systolic pulmonary artery pressure is 20 mmhg.    Estimated central venous pressure equal to 3 mmHg.    No previous study for comparison.    Lab " Review   Lab Results   Component Value Date     10/28/2021    CO2 25 10/28/2021    BUN 14 10/28/2021     Lab Results   Component Value Date    WBC 5.3 06/03/2021    HGB 14.7 06/03/2021    HCT 43.0 06/03/2021    MCV 86 06/03/2021     06/03/2021     Lab Results   Component Value Date    CHOL 203 (H) 09/18/2020    CHOL 151 01/14/2020    CHOL 154 05/14/2019     Lab Results   Component Value Date    HDL 57 09/18/2020    HDL 49 (L) 01/14/2020    HDL 62 05/14/2019     No components found for: LDLCALC  Lab Results   Component Value Date    TRIG 143 09/18/2020    TRIG 190 (H) 01/14/2020    TRIG 53 05/14/2019     No components found for: CHOLHDL  Lab Results   Component Value Date    TROPONINI <0.01 12/13/2019     No results found for: BNP  Lab Results   Component Value Date    TSH 2.11 08/12/2021                 Thank you for allowing me to participate in the care of your patient.      Sincerely,     Michael Ospina MD     [facility]  cc:   Referred Self  No address on file

## 2021-12-09 NOTE — PATIENT INSTRUCTIONS
Arabella Suero,    It is my pleasure to see you today at the [facility].    My recommendations for this visit are:    1.  Continue atenolol and losartan  2.  Start amlodipine 5 mg daily for better blood pressure control.  Check you blood pressure daily to make sure your numbers less than 140/90 mmHg.  3.  Star Crestor 10 mg at bedtime.  Repeat lipid profile and ALT in 2 months.  4.  Will see you again in 6 months      Michael Ospina MD, PhD

## 2022-06-24 ENCOUNTER — PATIENT OUTREACH (OUTPATIENT)
Dept: FAMILY MEDICINE | Facility: CLINIC | Age: 87
End: 2022-06-24

## 2022-06-24 NOTE — TELEPHONE ENCOUNTER
Patient Quality Outreach      Summary:    Patient has the following on her problem list/HM:   Hypertension   Last three blood pressure readings:  BP Readings from Last 3 Encounters:   12/09/21 (!) 162/90   04/16/21 130/60   09/01/20 (!) 148/89     Blood pressure: Failed    HTN Guidelines:  ? 139/89     Patient is due/failing the following:   Hypertension -  Hypertension follow-up visit    Type of outreach:    Pt is no longer a [facility] Pt- Nows goes to [facility] in [facility]. 6/7/22     Questions for provider review:    None                                                                                                                                     Angelic Ellis, CMA

## 2022-08-08 ENCOUNTER — TRANSFERRED RECORDS (OUTPATIENT)
Dept: HEALTH INFORMATION MANAGEMENT | Facility: CLINIC | Age: 87
End: 2022-08-08

## 2022-08-09 ENCOUNTER — TRANSCRIBE ORDERS (OUTPATIENT)
Dept: RESPIRATORY THERAPY | Facility: CLINIC | Age: 87
End: 2022-08-09

## 2022-08-09 DIAGNOSIS — R06.02 SOB (SHORTNESS OF BREATH): Primary | ICD-10-CM

## 2022-08-23 DIAGNOSIS — I10 PRIMARY HYPERTENSION: ICD-10-CM

## 2022-08-23 RX ORDER — AMLODIPINE BESYLATE 5 MG/1
TABLET ORAL
Qty: 90 TABLET | Refills: 2 | Status: SHIPPED | OUTPATIENT
Start: 2022-08-23

## 2022-09-11 DIAGNOSIS — I25.10 CORONARY ARTERY DISEASE INVOLVING NATIVE CORONARY ARTERY OF NATIVE HEART WITHOUT ANGINA PECTORIS: ICD-10-CM

## 2022-09-12 RX ORDER — ROSUVASTATIN CALCIUM 10 MG/1
TABLET, COATED ORAL
Qty: 90 TABLET | Refills: 2 | Status: SHIPPED | OUTPATIENT
Start: 2022-09-12

## 2022-09-14 ENCOUNTER — LAB (OUTPATIENT)
Dept: LAB | Facility: CLINIC | Age: 87
End: 2022-09-14
Payer: COMMERCIAL

## 2022-09-14 DIAGNOSIS — I25.10 CORONARY ARTERY DISEASE INVOLVING NATIVE CORONARY ARTERY OF NATIVE HEART WITHOUT ANGINA PECTORIS: ICD-10-CM

## 2022-09-14 LAB
ALT SERPL W P-5'-P-CCNC: 14 U/L (ref 0–45)
CHOLEST SERPL-MCNC: 173 MG/DL
FASTING STATUS PATIENT QL REPORTED: YES
HDLC SERPL-MCNC: 49 MG/DL
LDLC SERPL CALC-MCNC: 92 MG/DL
TRIGL SERPL-MCNC: 161 MG/DL

## 2022-09-14 PROCEDURE — 84460 ALANINE AMINO (ALT) (SGPT): CPT

## 2022-09-14 PROCEDURE — 36415 COLL VENOUS BLD VENIPUNCTURE: CPT

## 2022-09-14 PROCEDURE — 80061 LIPID PANEL: CPT

## 2022-09-15 DIAGNOSIS — I10 PRIMARY HYPERTENSION: Primary | ICD-10-CM

## 2022-09-15 DIAGNOSIS — I25.10 CORONARY ARTERY DISEASE INVOLVING NATIVE CORONARY ARTERY OF NATIVE HEART WITHOUT ANGINA PECTORIS: ICD-10-CM

## 2022-09-15 DIAGNOSIS — E78.00 HYPERCHOLESTEROLEMIA: ICD-10-CM

## 2023-03-27 ENCOUNTER — LAB REQUISITION (OUTPATIENT)
Dept: LAB | Facility: CLINIC | Age: 88
End: 2023-03-27

## 2023-03-27 DIAGNOSIS — I10 ESSENTIAL (PRIMARY) HYPERTENSION: ICD-10-CM

## 2023-03-27 DIAGNOSIS — F03.90 UNSPECIFIED DEMENTIA, UNSPECIFIED SEVERITY, WITHOUT BEHAVIORAL DISTURBANCE, PSYCHOTIC DISTURBANCE, MOOD DISTURBANCE, AND ANXIETY (H): ICD-10-CM

## 2023-03-27 DIAGNOSIS — E78.2 MIXED HYPERLIPIDEMIA: ICD-10-CM

## 2023-03-27 DIAGNOSIS — E55.9 VITAMIN D DEFICIENCY, UNSPECIFIED: ICD-10-CM

## 2023-03-27 PROCEDURE — 80061 LIPID PANEL: CPT | Performed by: FAMILY MEDICINE

## 2023-03-27 PROCEDURE — 82306 VITAMIN D 25 HYDROXY: CPT | Performed by: FAMILY MEDICINE

## 2023-03-27 PROCEDURE — 82310 ASSAY OF CALCIUM: CPT | Performed by: FAMILY MEDICINE

## 2023-03-27 PROCEDURE — 82607 VITAMIN B-12: CPT | Performed by: FAMILY MEDICINE

## 2023-03-28 LAB
ANION GAP SERPL CALCULATED.3IONS-SCNC: 21 MMOL/L (ref 7–15)
BUN SERPL-MCNC: 16.9 MG/DL (ref 8–23)
CALCIUM SERPL-MCNC: 9.6 MG/DL (ref 8.8–10.2)
CHLORIDE SERPL-SCNC: 104 MMOL/L (ref 98–107)
CHOLEST SERPL-MCNC: 195 MG/DL
CREAT SERPL-MCNC: 0.74 MG/DL (ref 0.51–0.95)
DEPRECATED CALCIDIOL+CALCIFEROL SERPL-MC: 83 UG/L (ref 20–75)
DEPRECATED HCO3 PLAS-SCNC: 18 MMOL/L (ref 22–29)
GFR SERPL CREATININE-BSD FRML MDRD: 78 ML/MIN/1.73M2
GLUCOSE SERPL-MCNC: 94 MG/DL (ref 70–99)
HDLC SERPL-MCNC: 52 MG/DL
LDLC SERPL CALC-MCNC: 102 MG/DL
NONHDLC SERPL-MCNC: 143 MG/DL
POTASSIUM SERPL-SCNC: 4 MMOL/L (ref 3.4–5.3)
SODIUM SERPL-SCNC: 143 MMOL/L (ref 136–145)
TRIGL SERPL-MCNC: 206 MG/DL
VIT B12 SERPL-MCNC: 805 PG/ML (ref 232–1245)